# Patient Record
Sex: FEMALE | Race: WHITE | Employment: OTHER | URBAN - METROPOLITAN AREA
[De-identification: names, ages, dates, MRNs, and addresses within clinical notes are randomized per-mention and may not be internally consistent; named-entity substitution may affect disease eponyms.]

---

## 2019-04-19 ENCOUNTER — HOSPITAL ENCOUNTER (OUTPATIENT)
Dept: NEUROLOGY | Age: 84
Discharge: HOME | End: 2019-04-19
Payer: MEDICARE

## 2019-04-19 VITALS — BODY MASS INDEX: 30.4 KG/M2 | WEIGHT: 161 LBS | HEIGHT: 61 IN

## 2019-04-19 PROCEDURE — 95913 NRV CNDJ TEST 13/> STUDIES: CPT

## 2019-04-19 PROCEDURE — 95886 MUSC TEST DONE W/N TEST COMP: CPT | Performed by: PSYCHIATRY & NEUROLOGY

## 2019-04-19 PROCEDURE — 95886 MUSC TEST DONE W/N TEST COMP: CPT

## 2019-04-19 PROCEDURE — 95912 NRV CNDJ TEST 11-12 STUDIES: CPT | Performed by: PSYCHIATRY & NEUROLOGY

## 2019-04-19 NOTE — PROCEDURES
[address]  [facility].  Electrodiagnostic Laboratory  [place]        Full Name: Ethel Alaniz Gender: Female  MRN: [id number] YOB: 1934  Location[de-identified] [facility] (01)      Visit Date: 4/19/2019 08:07  Age: 80 Years 0 Months Old  Examining Physician: Dr. Kirsten Castañeda   Referring Physician: Dr. Beny Camejo  Technician: Mirna Mathis   Height: 5 feet 1 inch  Weight: 161 lbs  Notes: Right hand numbness      Motor NCS      Nerve / Sites Lat. Amplitude Amp. 1-2 Distance Lat Diff Velocity Temp.    ms mV % cm ms m/s °C   R Median - APB      Wrist 2.60 3.6 100 8   32      Elbow 8.28 3.4 92.2 23 5.68 41 32   L Median - APB      Wrist 3.96 3.9 100 8   32      Elbow 8.23 3.6 91.1 23 4.27 54 32   R Ulnar - ADM      Wrist 2.45 8.2 100 8   32      B. Elbow 5.78 7.6 93.1 20 3.33 60 32      A. Elbow 7.50 6.7 81.8 10 1.72 58 32   L Ulnar - ADM      Wrist 2.24 7.4 100 8   32      B. Elbow 5.52 6.5 87.9 19 3.28 58 32      A. Elbow 7.50 6.3 85.3 10 1.98 51 32       Sensory NCS      Nerve / Sites Onset Lat Peak Lat PP Amp Distance Velocity Temp.    ms ms µV cm m/s °C   R Median - Digit II (Antidromic)      Mid Palm 1.09 1.98 12.6 7 64 32.3      Wrist 3.28 4.74 11.1 14 43 32.3   L Median - Digit II (Antidromic)      Mid Palm 1.20 1.77 32.5 7 58 32.9      Wrist 2.60 3.49 34.6 14 54 32.9   R Ulnar - Digit V (Antidromic)      Wrist 2.55 3.28 20.2 14 55 32.2   L Ulnar - Digit V (Antidromic)      Wrist 2.03 2.92 27.9 14 69 32   R Radial - Anatomical snuff box (Forearm)      Forearm 1.20 1.72 11.4 10 83 32.3   L Radial - Anatomical snuff box (Forearm)      Forearm 1.30 1.72 5.8 10 77 31.7       Combined Sensory Index      Nerve / Sites Rec. Site Peak Lat NP Amp PP Amp Segments Peak Diff Temp.      ms µV µV  ms °C   L Median - CSI      Median Thumb 2.86 20.0 32.8 Median - Radial 0.42 32.1      Radial Thumb 2.45 10.4 13.4 Median - Ulnar 0.73 32.1      Median Ring 3.75 11.1 18.3 Median palm - Ulnar palm 0.36 32.2      Ulnar Ring radiculopathies can not be evaluated by electrodiagnostic means. Clinically, the patient presented with nocturnal paresthesias in both hands. On brief neurological examination, I found Tinel's signs at both wrists but no motor or sensory compromise. Her difficulties are likely the result of the carpal tunnel syndrome. I recommended nonsteroidals at night and wrist splints for now. Clinical correlation was highly advised.

## 2020-09-14 VITALS
TEMPERATURE: 96.8 F | SYSTOLIC BLOOD PRESSURE: 112 MMHG | DIASTOLIC BLOOD PRESSURE: 72 MMHG | WEIGHT: 161 LBS | BODY MASS INDEX: 30.4 KG/M2 | RESPIRATION RATE: 14 BRPM | HEIGHT: 61 IN | HEART RATE: 78 BPM

## 2020-11-16 RX ORDER — MELATONIN
1000 DAILY
Qty: 90 TABLET | Refills: 1 | Status: SHIPPED
Start: 2020-11-16 | End: 2021-06-17 | Stop reason: SDUPTHER

## 2020-11-18 NOTE — TELEPHONE ENCOUNTER
Patient called back states she wanted script sent to [facility] in [address] [facility]. I called [facility] spoke to the pharmacy they stated they would transfer her script over.

## 2020-12-14 NOTE — TELEPHONE ENCOUNTER
Pt called refill line and is not working, please refill levothyroxine 0.025 1 1/2 per day at [facility] in [place]

## 2020-12-16 RX ORDER — LEVOTHYROXINE SODIUM 0.03 MG/1
TABLET ORAL
Qty: 135 TABLET | Refills: 1 | Status: SHIPPED | OUTPATIENT
Start: 2020-12-16 | End: 2021-05-27 | Stop reason: SDUPTHER

## 2021-01-13 ENCOUNTER — TELEPHONE (OUTPATIENT)
Dept: FAMILY MEDICINE CLINIC | Age: 86
End: 2021-01-13

## 2021-01-13 NOTE — TELEPHONE ENCOUNTER
Spoke to patient information provided she had a localized swelling in the arm with the flu shot many years ago she does not have any egg allergy information provided

## 2021-01-20 ENCOUNTER — IMMUNIZATION (OUTPATIENT)
Dept: PRIMARY CARE CLINIC | Age: 86
End: 2021-01-20
Payer: MEDICARE

## 2021-01-20 DIAGNOSIS — Z23 HIGH PRIORITY FOR COVID-19 VIRUS VACCINATION: Primary | ICD-10-CM

## 2021-01-20 PROCEDURE — 91301 COVID-19, MODERNA VACCINE 100MCG/0.5ML DOSE: CPT | Performed by: NURSE PRACTITIONER

## 2021-01-20 PROCEDURE — 0011A COVID-19, MODERNA VACCINE 100MCG/0.5ML DOSE: CPT | Performed by: NURSE PRACTITIONER

## 2021-01-29 RX ORDER — OMEPRAZOLE 20 MG/1
40 TABLET, DELAYED RELEASE ORAL DAILY
Qty: 90 TABLET | Refills: 1 | Status: SHIPPED
Start: 2021-01-29 | End: 2021-02-04

## 2021-01-29 NOTE — TELEPHONE ENCOUNTER
Patient is requesting a refill of:  Medication:Omeprazole   Dose: 40mg  Frequency: qd  Pharmacy: [facility] seen Visit date not found  Next appt Visit date not found

## 2021-02-04 ENCOUNTER — OFFICE VISIT (OUTPATIENT)
Dept: FAMILY MEDICINE CLINIC | Age: 86
End: 2021-02-04
Payer: MEDICARE

## 2021-02-04 VITALS
BODY MASS INDEX: 29.95 KG/M2 | SYSTOLIC BLOOD PRESSURE: 130 MMHG | OXYGEN SATURATION: 99 % | HEIGHT: 63 IN | WEIGHT: 169 LBS | DIASTOLIC BLOOD PRESSURE: 70 MMHG | HEART RATE: 73 BPM

## 2021-02-04 DIAGNOSIS — J45.909 MILD ASTHMA WITHOUT COMPLICATION, UNSPECIFIED WHETHER PERSISTENT: Primary | ICD-10-CM

## 2021-02-04 DIAGNOSIS — K21.9 GASTROESOPHAGEAL REFLUX DISEASE WITHOUT ESOPHAGITIS: ICD-10-CM

## 2021-02-04 DIAGNOSIS — E03.9 HYPOTHYROIDISM, UNSPECIFIED TYPE: ICD-10-CM

## 2021-02-04 DIAGNOSIS — Z85.3 HISTORY OF BREAST CANCER: ICD-10-CM

## 2021-02-04 DIAGNOSIS — R05.9 COUGH: ICD-10-CM

## 2021-02-04 PROCEDURE — G8427 DOCREV CUR MEDS BY ELIG CLIN: HCPCS | Performed by: INTERNAL MEDICINE

## 2021-02-04 PROCEDURE — 1090F PRES/ABSN URINE INCON ASSESS: CPT | Performed by: INTERNAL MEDICINE

## 2021-02-04 PROCEDURE — 1123F ACP DISCUSS/DSCN MKR DOCD: CPT | Performed by: INTERNAL MEDICINE

## 2021-02-04 PROCEDURE — 4004F PT TOBACCO SCREEN RCVD TLK: CPT | Performed by: INTERNAL MEDICINE

## 2021-02-04 PROCEDURE — 4040F PNEUMOC VAC/ADMIN/RCVD: CPT | Performed by: INTERNAL MEDICINE

## 2021-02-04 PROCEDURE — 3288F FALL RISK ASSESSMENT DOCD: CPT | Performed by: INTERNAL MEDICINE

## 2021-02-04 PROCEDURE — G8417 CALC BMI ABV UP PARAM F/U: HCPCS | Performed by: INTERNAL MEDICINE

## 2021-02-04 PROCEDURE — 99213 OFFICE O/P EST LOW 20 MIN: CPT | Performed by: INTERNAL MEDICINE

## 2021-02-04 PROCEDURE — G8484 FLU IMMUNIZE NO ADMIN: HCPCS | Performed by: INTERNAL MEDICINE

## 2021-02-04 RX ORDER — OMEPRAZOLE 40 MG/1
CAPSULE, DELAYED RELEASE ORAL
COMMUNITY
Start: 2020-10-28 | End: 2021-02-04 | Stop reason: SDUPTHER

## 2021-02-04 RX ORDER — OMEPRAZOLE 40 MG/1
40 CAPSULE, DELAYED RELEASE ORAL DAILY
Qty: 90 CAPSULE | Refills: 1 | Status: SHIPPED
Start: 2021-02-04 | End: 2021-08-09 | Stop reason: SDUPTHER

## 2021-02-04 SDOH — ECONOMIC STABILITY: INCOME INSECURITY: HOW HARD IS IT FOR YOU TO PAY FOR THE VERY BASICS LIKE FOOD, HOUSING, MEDICAL CARE, AND HEATING?: NOT ASKED

## 2021-02-04 SDOH — ECONOMIC STABILITY: TRANSPORTATION INSECURITY
IN THE PAST 12 MONTHS, HAS LACK OF TRANSPORTATION KEPT YOU FROM MEETINGS, WORK, OR FROM GETTING THINGS NEEDED FOR DAILY LIVING?: NO

## 2021-02-04 ASSESSMENT — PATIENT HEALTH QUESTIONNAIRE - PHQ9
SUM OF ALL RESPONSES TO PHQ QUESTIONS 1-9: 0
SUM OF ALL RESPONSES TO PHQ9 QUESTIONS 1 & 2: 0
SUM OF ALL RESPONSES TO PHQ QUESTIONS 1-9: 0

## 2021-02-04 NOTE — PROGRESS NOTES
Subjective:     Chief Complaint   Patient presents with    Asthma    Joint Pain      Follow-up on the blood work check thyroid a GE reflux disease  Has slight prominence of the left sternoclavicular joint    Denies feeling any lumps  Has occasional cough    Has appointment to see Dr. Richard Paige    Has been using the nebulizer    Sees Dr. Nakia Palmer for breast cancer once a year    Has generalized osteoarthritis  Past Medical History:   Diagnosis Date    Arthritis     OSTEOPORASIS    Asthma     STABLE    CAD (coronary artery disease)     30% stenosis per cath 2007    Cancer [facility]) 2004    BREAST RIGHT    Cataracts, both eyes     Eczema     Hypothyroidism     Macular degeneration     Osteoarthritis     Rosacea     Thyroid disease         Social History     Socioeconomic History    Marital status:      Spouse name: Not on file    Number of children: Not on file    Years of education: Not on file    Highest education level: Not on file   Occupational History    Not on file   Social Needs    Financial resource strain: Not on file    Food insecurity     Worry: Never true     Inability: Never true    Transportation needs     Medical: No     Non-medical: No   Tobacco Use    Smoking status: Never Smoker    Smokeless tobacco: Never Used   Substance and Sexual Activity    Alcohol use: No    Drug use: No    Sexual activity: Not on file   Lifestyle    Physical activity     Days per week: Not on file     Minutes per session: Not on file    Stress: Not on file   Relationships    Social connections     Talks on phone: Not on file     Gets together: Not on file     Attends Bahai service: Not on file     Active member of club or organization: Not on file     Attends meetings of clubs or organizations: Not on file     Relationship status: Not on file    Intimate partner violence     Fear of current or ex partner: Not on file     Emotionally abused: Not on file     Physically abused: Not on file Forced sexual activity: Not on file   Other Topics Concern    Not on file   Social History Narrative    Not on file        Past Surgical History:   Procedure Laterality Date    [facility]    BREAST LUMPECTOMY Right     BREAST SURGERY      LUMPECTOMY RIGHT    CARDIAC CATHETERIZATION  03/2007    COLONOSCOPY      HYSTERECTOMY      JOINT REPLACEMENT  Jan. 30,2012.    bilateral knee replacements        No family history on file. Allergies   Allergen Reactions    Penicillins Rash    Chlorpheniramine Maleate      SPLENDA, FLU SHOT    Flu Virus Vaccine Swelling    Protonix [Pantoprazole Sodium]         ROS  No acute distress  Cardiac: Denies any chest pain or palpitation  Respiratory: Denies any cough or shortness of breath  History of asthma using nebulizer as needed has appointment to see the pulmonologist  GI: No abdominal pain. Denies any nausea vomiting or diarrhea  : Denies any dysuria frequency or hematuria  Neuro: No headache or dizziness  Endocrine: Hyperglycemia  Skin: normal  No recent weight gain or weight loss  Denies any change in vision    Objective:    /70   Pulse 73   Ht 5' 3\" (1.6 m)   Wt 169 lb (76.7 kg)   SpO2 99%   BMI 29.94 kg/m²     Constitutional: Alert awake and oriented  Eyes: Pupils equal bilaterally. Extraocular muscles intact  Neck: no JVD adenopathy no bruit  Chest slight prominence of the left sternoclavicular joint                                  No  lymphadenopathy  Heart:  RRR, no murmurs, gallops, or rubs.   Lungs:    no wheeze, rales or rhonchi  Abd: bowel sounds present, nontender, nondistended, no masses  Extrem:  No clubbing, cyanosis, or edema  Neuro: AAOx3,No Focal deficit  Psychological: no depression or anxiety       Current Outpatient Medications   Medication Sig Dispense Refill    omeprazole (PRILOSEC) 40 MG delayed release capsule Take 1 capsule by mouth daily 90 capsule 1  levothyroxine (SYNTHROID) 25 MCG tablet One and one half tablets daily 135 tablet 1    vitamin D3 (CHOLECALCIFEROL) 25 MCG (1000 UT) TABS tablet Take 1 tablet by mouth daily 90 tablet 1    famotidine (PEPCID) 20 MG tablet Take 20 mg by mouth nightly.  albuterol (PROVENTIL) (2.5 MG/3ML) 0.083% nebulizer solution Take 2.5 mg by nebulization every 6 hours as needed.  fluticasone (FLONASE) 50 MCG/ACT nasal spray 1 spray by Nasal route nightly.  calcium carbonate 600 MG TABS tablet Take 1 tablet by mouth 4 times daily. No current facility-administered medications for this visit.          Last 3 BMP  Lab Results   Component Value Date/Time     01/18/2021 08:31 AM     04/01/2014 01:20 PM     08/15/2012 11:05 AM    K 4.8 01/18/2021 08:31 AM    K 3.8 04/01/2014 01:20 PM    K 4.0 08/15/2012 11:05 AM     01/18/2021 08:31 AM     04/01/2014 01:20 PM     08/15/2012 11:05 AM    CO2 23 01/18/2021 08:31 AM    CO2 25 04/01/2014 01:20 PM    CO2 31 08/15/2012 11:05 AM    BUN 21 01/18/2021 08:31 AM    BUN 13 04/01/2014 01:20 PM    BUN 18 08/15/2012 11:05 AM    CREATININE 1.0 01/18/2021 08:31 AM    CREATININE 1.1 (H) 04/01/2014 01:20 PM    CREATININE 0.8 08/15/2012 11:05 AM    GLUCOSE 103 (H) 01/18/2021 08:31 AM    GLUCOSE 121 (H) 04/01/2014 01:20 PM    GLUCOSE 85 08/15/2012 11:05 AM    GLUCOSE 90 02/09/2012 05:20 AM    GLUCOSE 108 02/06/2012 05:10 AM    GLUCOSE 91 02/03/2012 05:08 AM    CALCIUM 10.0 01/18/2021 08:31 AM    CALCIUM 9.0 04/01/2014 01:20 PM    CALCIUM 9.8 08/15/2012 11:05 AM       Last 3 CMP:    Lab Results   Component Value Date/Time     01/18/2021 08:31 AM     04/01/2014 01:20 PM     08/15/2012 11:05 AM    K 4.8 01/18/2021 08:31 AM    K 3.8 04/01/2014 01:20 PM    K 4.0 08/15/2012 11:05 AM     01/18/2021 08:31 AM     04/01/2014 01:20 PM     08/15/2012 11:05 AM    CO2 23 01/18/2021 08:31 AM    CO2 25 04/01/2014 01:20 PM CO2 31 08/15/2012 11:05 AM    BUN 21 01/18/2021 08:31 AM    BUN 13 04/01/2014 01:20 PM    BUN 18 08/15/2012 11:05 AM    CREATININE 1.0 01/18/2021 08:31 AM    CREATININE 1.1 (H) 04/01/2014 01:20 PM    CREATININE 0.8 08/15/2012 11:05 AM    GLUCOSE 103 (H) 01/18/2021 08:31 AM    GLUCOSE 121 (H) 04/01/2014 01:20 PM    GLUCOSE 85 08/15/2012 11:05 AM    GLUCOSE 90 02/09/2012 05:20 AM    GLUCOSE 108 02/06/2012 05:10 AM    GLUCOSE 91 02/03/2012 05:08 AM    CALCIUM 10.0 01/18/2021 08:31 AM    CALCIUM 9.0 04/01/2014 01:20 PM    CALCIUM 9.8 08/15/2012 11:05 AM    PROT 7.2 01/18/2021 08:31 AM    PROT 5.0 (L) 02/01/2012 04:20 AM    LABALBU 4.3 01/18/2021 08:31 AM    LABALBU 2.9 (L) 02/01/2012 04:20 AM    BILITOT 0.6 01/18/2021 08:31 AM    BILITOT 0.4 02/01/2012 04:20 AM    ALKPHOS 68 01/18/2021 08:31 AM    ALKPHOS 36 (L) 02/01/2012 04:20 AM    AST 16 01/18/2021 08:31 AM    AST 14 02/01/2012 04:20 AM    ALT 12 01/18/2021 08:31 AM    ALT 11 02/01/2012 04:20 AM        CBC:   Lab Results   Component Value Date/Time    WBC 4.0 (L) 01/18/2021 08:31 AM    RBC 4.66 01/18/2021 08:31 AM    HGB 14.1 01/18/2021 08:31 AM    HCT 44.2 01/18/2021 08:31 AM    MCV 94.8 01/18/2021 08:31 AM    MCH 30.3 01/18/2021 08:31 AM    MCHC 31.9 (L) 01/18/2021 08:31 AM    RDW 14.1 01/18/2021 08:31 AM     01/18/2021 08:31 AM    MPV 11.2 01/18/2021 08:31 AM       A1C:  Lab Results   Component Value Date/Time    LABA1C 5.9 (H) 01/18/2021 08:31 AM       Lipid panel:  Lab Results   Component Value Date    CHOL 190 01/18/2021    TRIG 101 01/18/2021    HDL 65 01/18/2021        Lab Results   Component Value Date/Time    PROT 7.2 01/18/2021 08:31 AM    PROT 5.0 (L) 02/01/2012 04:20 AM       No results found for: MG      Assessment. Debi Houston was seen today for asthma and joint pain. Diagnoses and all orders for this visit:    Mild asthma without complication, unspecified whether persistent  -     XR CHEST (2 VW);  Future    Cough -     XR CHEST (2 VW); Future    Hypothyroidism, unspecified type    Gastroesophageal reflux disease without esophagitis    History of breast cancer    Other orders  -     omeprazole (PRILOSEC) 40 MG delayed release capsule; Take 1 capsule by mouth daily       Patient Active Problem List   Diagnosis    DJD (degenerative joint disease) of knee    Asthma    Carotid stenosis, asymptomatic    Spinal stenosis, lumbar region, without neurogenic claudication       Plan: Labs reviewed  Asthma doing very well    GE reflux disease controlled with omeprazole 40 mg    She sees gastroenterologist    Get a chest x-ray PA and lateral and follow-up with Dr. Meryle Chard has appointment next month with PFTs      Same thyroid medication    Diet medication discussed      Continue Flonase no spray          Return in about 3 months (around 5/4/2021).        Brittnee Lin MD  2:36 PM  2/4/2021     [place]

## 2021-02-17 ENCOUNTER — IMMUNIZATION (OUTPATIENT)
Dept: PRIMARY CARE CLINIC | Age: 86
End: 2021-02-17
Payer: MEDICARE

## 2021-02-17 PROCEDURE — 91301 COVID-19, MODERNA VACCINE 100MCG/0.5ML DOSE: CPT | Performed by: NURSE PRACTITIONER

## 2021-02-17 PROCEDURE — 0012A COVID-19, MODERNA VACCINE 100MCG/0.5ML DOSE: CPT | Performed by: NURSE PRACTITIONER

## 2021-05-27 ENCOUNTER — OFFICE VISIT (OUTPATIENT)
Dept: FAMILY MEDICINE CLINIC | Age: 86
End: 2021-05-27
Payer: MEDICARE

## 2021-05-27 VITALS
DIASTOLIC BLOOD PRESSURE: 70 MMHG | SYSTOLIC BLOOD PRESSURE: 120 MMHG | WEIGHT: 154 LBS | BODY MASS INDEX: 27.28 KG/M2 | HEART RATE: 63 BPM | TEMPERATURE: 98 F | OXYGEN SATURATION: 98 %

## 2021-05-27 DIAGNOSIS — E78.49 OTHER HYPERLIPIDEMIA: Primary | ICD-10-CM

## 2021-05-27 DIAGNOSIS — R73.9 HYPERGLYCEMIA: ICD-10-CM

## 2021-05-27 DIAGNOSIS — E03.9 ACQUIRED HYPOTHYROIDISM: ICD-10-CM

## 2021-05-27 DIAGNOSIS — D70.9 NEUTROPENIA, UNSPECIFIED TYPE (HCC): ICD-10-CM

## 2021-05-27 DIAGNOSIS — J45.20 MILD INTERMITTENT ASTHMA WITHOUT COMPLICATION: ICD-10-CM

## 2021-05-27 DIAGNOSIS — F41.1 GENERALIZED ANXIETY DISORDER: ICD-10-CM

## 2021-05-27 PROCEDURE — 4040F PNEUMOC VAC/ADMIN/RCVD: CPT | Performed by: INTERNAL MEDICINE

## 2021-05-27 PROCEDURE — 4004F PT TOBACCO SCREEN RCVD TLK: CPT | Performed by: INTERNAL MEDICINE

## 2021-05-27 PROCEDURE — G8427 DOCREV CUR MEDS BY ELIG CLIN: HCPCS | Performed by: INTERNAL MEDICINE

## 2021-05-27 PROCEDURE — 1123F ACP DISCUSS/DSCN MKR DOCD: CPT | Performed by: INTERNAL MEDICINE

## 2021-05-27 PROCEDURE — 1090F PRES/ABSN URINE INCON ASSESS: CPT | Performed by: INTERNAL MEDICINE

## 2021-05-27 PROCEDURE — G8417 CALC BMI ABV UP PARAM F/U: HCPCS | Performed by: INTERNAL MEDICINE

## 2021-05-27 PROCEDURE — 99213 OFFICE O/P EST LOW 20 MIN: CPT | Performed by: INTERNAL MEDICINE

## 2021-05-27 RX ORDER — ALPRAZOLAM 0.5 MG/1
0.5 TABLET ORAL 3 TIMES DAILY PRN
Qty: 20 TABLET | Refills: 0 | Status: SHIPPED | OUTPATIENT
Start: 2021-05-27 | End: 2021-06-26

## 2021-05-27 RX ORDER — LEVOTHYROXINE SODIUM 0.03 MG/1
TABLET ORAL
Qty: 135 TABLET | Refills: 1 | Status: SHIPPED
Start: 2021-05-27 | End: 2021-09-30 | Stop reason: SDUPTHER

## 2021-05-27 NOTE — PROGRESS NOTES
Friends and Family:     Attends Worship Services:     Active Member of Clubs or Organizations:     Attends Club or Organization Meetings:     Marital Status:    Intimate Partner Violence:     Fear of Current or Ex-Partner:     Emotionally Abused:     Physically Abused:     Sexually Abused:         Past Surgical History:   Procedure Laterality Date    APPENDECTOMY      BACK SURGERY      LUMBAR FUSION    BREAST LUMPECTOMY Right     BREAST SURGERY      LUMPECTOMY RIGHT    CARDIAC CATHETERIZATION  03/2007    COLONOSCOPY      HYSTERECTOMY      JOINT REPLACEMENT  Jan. 30,2012.    bilateral knee replacements        No family history on file. Allergies   Allergen Reactions    Penicillins Rash    Chlorpheniramine Maleate      SPLENDA, FLU SHOT    Flu Virus Vaccine Swelling    Protonix [Pantoprazole Sodium]         ROS  No acute distress  Cardiac: Denies any chest pain or palpitation    Seen by Dr. Fanny Solares in 2018 he did not recommend any further work-up unless any symptoms  Respiratory: Denies any cough or shortness of breath  Seen by pulmonologist Dr. Cathy Weathers in March 2020  GI: No abdominal pain. Denies any nausea vomiting or diarrhea    Colonoscopy August 2018 with Dr. Cresencio Moya to repeat as needed  : Denies any dysuria frequency or hematuria  Neuro: No headache or dizziness  Endocrine: No diabetes  Skin: normal  No recent weight gain or weight loss  Denies any change in vision    Objective:    /70   Pulse 63   Temp 98 °F (36.7 °C)   Wt 154 lb (69.9 kg)   SpO2 98%   BMI 27.28 kg/m²     Constitutional: Alert awake and oriented  Eyes: Pupils equal bilaterally. Extraocular muscles intact  Neck: no JVD adenopathy no bruit  Heart:  RRR, no murmurs, gallops, or rubs.   Lungs:    no wheeze, rales or rhonchi  Abd: bowel sounds present, nontender, nondistended, no masses  Extrem:  No clubbing, cyanosis, or edema  Neuro: AAOx3,No Focal deficit  Psychological: no depression   Anxious about 's terminal illness      Current Outpatient Medications   Medication Sig Dispense Refill    levothyroxine (SYNTHROID) 25 MCG tablet One and one half tablets daily 135 tablet 1    ALPRAZolam (XANAX) 0.5 MG tablet Take 1 tablet by mouth 3 times daily as needed for Sleep or Anxiety for up to 30 days. 20 tablet 0    omeprazole (PRILOSEC) 40 MG delayed release capsule Take 1 capsule by mouth daily 90 capsule 1    vitamin D3 (CHOLECALCIFEROL) 25 MCG (1000 UT) TABS tablet Take 1 tablet by mouth daily 90 tablet 1    famotidine (PEPCID) 20 MG tablet Take 20 mg by mouth nightly.  albuterol (PROVENTIL) (2.5 MG/3ML) 0.083% nebulizer solution Take 2.5 mg by nebulization every 6 hours as needed.  fluticasone (FLONASE) 50 MCG/ACT nasal spray 1 spray by Nasal route nightly.  calcium carbonate 600 MG TABS tablet Take 1 tablet by mouth 4 times daily. No current facility-administered medications for this visit.         Last 3 BMP  Lab Results   Component Value Date/Time     01/18/2021 08:31 AM     04/01/2014 01:20 PM     08/15/2012 11:05 AM    K 4.8 01/18/2021 08:31 AM    K 3.8 04/01/2014 01:20 PM    K 4.0 08/15/2012 11:05 AM     01/18/2021 08:31 AM     04/01/2014 01:20 PM     08/15/2012 11:05 AM    CO2 23 01/18/2021 08:31 AM    CO2 25 04/01/2014 01:20 PM    CO2 31 08/15/2012 11:05 AM    BUN 21 01/18/2021 08:31 AM    BUN 13 04/01/2014 01:20 PM    BUN 18 08/15/2012 11:05 AM    CREATININE 1.0 01/18/2021 08:31 AM    CREATININE 1.1 (H) 04/01/2014 01:20 PM    CREATININE 0.8 08/15/2012 11:05 AM    GLUCOSE 103 (H) 01/18/2021 08:31 AM    GLUCOSE 121 (H) 04/01/2014 01:20 PM    GLUCOSE 85 08/15/2012 11:05 AM    GLUCOSE 90 02/09/2012 05:20 AM    GLUCOSE 108 02/06/2012 05:10 AM    GLUCOSE 91 02/03/2012 05:08 AM    CALCIUM 10.0 01/18/2021 08:31 AM    CALCIUM 9.0 04/01/2014 01:20 PM    CALCIUM 9.8 08/15/2012 11:05 AM       Last 3 CMP:    Lab Results   Component Value Date/Time    NA 144 01/18/2021 08:31 AM     04/01/2014 01:20 PM     08/15/2012 11:05 AM    K 4.8 01/18/2021 08:31 AM    K 3.8 04/01/2014 01:20 PM    K 4.0 08/15/2012 11:05 AM     01/18/2021 08:31 AM     04/01/2014 01:20 PM     08/15/2012 11:05 AM    CO2 23 01/18/2021 08:31 AM    CO2 25 04/01/2014 01:20 PM    CO2 31 08/15/2012 11:05 AM    BUN 21 01/18/2021 08:31 AM    BUN 13 04/01/2014 01:20 PM    BUN 18 08/15/2012 11:05 AM    CREATININE 1.0 01/18/2021 08:31 AM    CREATININE 1.1 (H) 04/01/2014 01:20 PM    CREATININE 0.8 08/15/2012 11:05 AM    GLUCOSE 103 (H) 01/18/2021 08:31 AM    GLUCOSE 121 (H) 04/01/2014 01:20 PM    GLUCOSE 85 08/15/2012 11:05 AM    GLUCOSE 90 02/09/2012 05:20 AM    GLUCOSE 108 02/06/2012 05:10 AM    GLUCOSE 91 02/03/2012 05:08 AM    CALCIUM 10.0 01/18/2021 08:31 AM    CALCIUM 9.0 04/01/2014 01:20 PM    CALCIUM 9.8 08/15/2012 11:05 AM    PROT 7.2 01/18/2021 08:31 AM    PROT 5.0 (L) 02/01/2012 04:20 AM    LABALBU 4.3 01/18/2021 08:31 AM    LABALBU 2.9 (L) 02/01/2012 04:20 AM    BILITOT 0.6 01/18/2021 08:31 AM    BILITOT 0.4 02/01/2012 04:20 AM    ALKPHOS 68 01/18/2021 08:31 AM    ALKPHOS 36 (L) 02/01/2012 04:20 AM    AST 16 01/18/2021 08:31 AM    AST 14 02/01/2012 04:20 AM    ALT 12 01/18/2021 08:31 AM    ALT 11 02/01/2012 04:20 AM        CBC:   Lab Results   Component Value Date/Time    WBC 4.0 (L) 01/18/2021 08:31 AM    RBC 4.66 01/18/2021 08:31 AM    HGB 14.1 01/18/2021 08:31 AM    HCT 44.2 01/18/2021 08:31 AM    MCV 94.8 01/18/2021 08:31 AM    MCH 30.3 01/18/2021 08:31 AM    MCHC 31.9 (L) 01/18/2021 08:31 AM    RDW 14.1 01/18/2021 08:31 AM     01/18/2021 08:31 AM    MPV 11.2 01/18/2021 08:31 AM       A1C:  Lab Results   Component Value Date/Time    LABA1C 5.9 (H) 01/18/2021 08:31 AM       Lipid panel:  Lab Results   Component Value Date    CHOL 190 01/18/2021    TRIG 101 01/18/2021    HDL 65 01/18/2021        Lab Results   Component Value Date/Time    PROT 7.2 01/18/2021 08:31 AM    PROT 5.0 (L) 02/01/2012 04:20 AM       No results found for: MG      Assessment. Maurine Scheuermann was seen today for anxiety. Diagnoses and all orders for this visit:    Other hyperlipidemia  -     COMPREHENSIVE METABOLIC PANEL; Future  -     LIPID PANEL; Future    Hyperglycemia  -     HEMOGLOBIN A1C; Future    Acquired hypothyroidism  -     TSH; Future  -     T4, FREE; Future    Neutropenia, unspecified type ([facility] [address]  -     CBC WITH AUTO DIFFERENTIAL; Future    Generalized anxiety disorder  -     ALPRAZolam (XANAX) 0.5 MG tablet; Take 1 tablet by mouth 3 times daily as needed for Sleep or Anxiety for up to 30 days. Other orders  -     levothyroxine (SYNTHROID) 25 MCG tablet; One and one half tablets daily       Patient Active Problem List   Diagnosis    DJD (degenerative joint disease) of knee    Asthma    Carotid stenosis, asymptomatic    Spinal stenosis, lumbar region, without neurogenic claudication       Plan: Hyperlipidemia low-cholesterol low-fat diet check CMP and lipid profile    Hypothyroidism continue Synthroid 25 check TSH and T4    Generalized anxiety due to  is on hospice patient was counseled, she has family support, Xanax 0.5 daily as needed side effect discussed    Asthma is controlled being followed by pulmonologist    Right total slightly low last time repeat CBC    Return in about 4 months (around 9/27/2021).        Nikolay Granda MD  1:59 PM  5/27/2021     [place]

## 2021-06-02 ENCOUNTER — OFFICE VISIT (OUTPATIENT)
Dept: FAMILY MEDICINE CLINIC | Age: 86
End: 2021-06-02
Payer: MEDICARE

## 2021-06-02 VITALS
BODY MASS INDEX: 28.89 KG/M2 | RESPIRATION RATE: 17 BRPM | TEMPERATURE: 97.3 F | DIASTOLIC BLOOD PRESSURE: 78 MMHG | WEIGHT: 153 LBS | OXYGEN SATURATION: 99 % | HEART RATE: 69 BPM | SYSTOLIC BLOOD PRESSURE: 122 MMHG | HEIGHT: 61 IN

## 2021-06-02 DIAGNOSIS — R30.0 DYSURIA: ICD-10-CM

## 2021-06-02 DIAGNOSIS — N30.90 CYSTITIS: Primary | ICD-10-CM

## 2021-06-02 DIAGNOSIS — E03.9 ACQUIRED HYPOTHYROIDISM: ICD-10-CM

## 2021-06-02 LAB
BILIRUBIN, POC: NORMAL
BLOOD URINE, POC: NORMAL
CLARITY, POC: CLEAR
COLOR, POC: YELLOW
GLUCOSE URINE, POC: NORMAL
KETONES, POC: NORMAL
LEUKOCYTE EST, POC: NORMAL
NITRITE, POC: NORMAL
PH, POC: 7
PROTEIN, POC: NORMAL
SPECIFIC GRAVITY, POC: 1.02
UROBILINOGEN, POC: 0.2

## 2021-06-02 PROCEDURE — G8417 CALC BMI ABV UP PARAM F/U: HCPCS | Performed by: PHYSICIAN ASSISTANT

## 2021-06-02 PROCEDURE — 1090F PRES/ABSN URINE INCON ASSESS: CPT | Performed by: PHYSICIAN ASSISTANT

## 2021-06-02 PROCEDURE — 99213 OFFICE O/P EST LOW 20 MIN: CPT | Performed by: PHYSICIAN ASSISTANT

## 2021-06-02 PROCEDURE — 1123F ACP DISCUSS/DSCN MKR DOCD: CPT | Performed by: PHYSICIAN ASSISTANT

## 2021-06-02 PROCEDURE — 81002 URINALYSIS NONAUTO W/O SCOPE: CPT | Performed by: PHYSICIAN ASSISTANT

## 2021-06-02 PROCEDURE — G8427 DOCREV CUR MEDS BY ELIG CLIN: HCPCS | Performed by: PHYSICIAN ASSISTANT

## 2021-06-02 PROCEDURE — 1036F TOBACCO NON-USER: CPT | Performed by: PHYSICIAN ASSISTANT

## 2021-06-02 PROCEDURE — 4040F PNEUMOC VAC/ADMIN/RCVD: CPT | Performed by: PHYSICIAN ASSISTANT

## 2021-06-02 RX ORDER — SULFAMETHOXAZOLE AND TRIMETHOPRIM 800; 160 MG/1; MG/1
1 TABLET ORAL 2 TIMES DAILY
Qty: 10 TABLET | Refills: 0 | Status: SHIPPED | OUTPATIENT
Start: 2021-06-02 | End: 2021-06-07

## 2021-06-02 NOTE — PATIENT INSTRUCTIONS
Patient Education        Urinary Tract Infection (UTI) in Women: Care Instructions  Overview     A urinary tract infection, or UTI, is a general term for an infection anywhere between the kidneys and the urethra (where urine comes out). Most UTIs are bladder infections. They often cause pain or burning when you urinate. UTIs are caused by bacteria and can be cured with antibiotics. Be sure to complete your treatment so that the infection does not get worse. Follow-up care is a key part of your treatment and safety. Be sure to make and go to all appointments, and call your doctor if you are having problems. It's also a good idea to know your test results and keep a list of the medicines you take. How can you care for yourself at home? · Take your antibiotics as directed. Do not stop taking them just because you feel better. You need to take the full course of antibiotics. · Drink extra water and other fluids for the next day or two. This will help make the urine less concentrated and help wash out the bacteria that are causing the infection. (If you have kidney, heart, or liver disease and have to limit fluids, talk with your doctor before you increase the amount of fluids you drink.)  · Avoid drinks that are carbonated or have caffeine. They can irritate the bladder. · Urinate often. Try to empty your bladder each time. · To relieve pain, take a hot bath or lay a heating pad set on low over your lower belly or genital area. Never go to sleep with a heating pad in place. To prevent UTIs  · Drink plenty of water each day. This helps you urinate often, which clears bacteria from your system. (If you have kidney, heart, or liver disease and have to limit fluids, talk with your doctor before you increase the amount of fluids you drink.)  · Urinate when you need to. · If you are sexually active, urinate right after you have sex. · Change sanitary pads often.   · Avoid douches, bubble baths, feminine hygiene sprays, and other feminine hygiene products that have deodorants. · After going to the bathroom, wipe from front to back. When should you call for help? Call your doctor now or seek immediate medical care if:    · Symptoms such as fever, chills, nausea, or vomiting get worse or appear for the first time.     · You have new pain in your back just below your rib cage. This is called flank pain.     · There is new blood or pus in your urine.     · You have any problems with your antibiotic medicine. Watch closely for changes in your health, and be sure to contact your doctor if:    · You are not getting better after taking an antibiotic for 2 days.     · Your symptoms go away but then come back. Where can you learn more? Go to [email or web address]://[email or web address]. org and sign in to your [employer] account. Enter N277 in the [employer] box to learn more about \"Urinary Tract Infection (UTI) in Women: Care Instructions. \"     If you do not have an account, please click on the \"Sign Up Now\" link. Current as of: June 29, 2020               Content Version: 12.8  © 2006-2021 [employer]. Care instructions adapted under license by [facility] [address] If you have questions about a medical condition or this instruction, always ask your healthcare professional. [place] [address] any warranty or liability for your use of this information. Patient Education        Painful Urination (Dysuria): Care Instructions  Your Care Instructions  Burning pain with urination (dysuria) is a common symptom of a urinary tract infection or other urinary problems. The bladder may become inflamed. This can cause pain when the bladder fills and empties. You may also feel pain if the tube that carries urine from the bladder to the outside of the body (urethra) gets irritated or infected. Sexually transmitted infections (STIs) also may cause pain when you urinate.   Sometimes the pain can be caused Instructions. \"     If you do not have an account, please click on the \"Sign Up Now\" link. Current as of: June 29, 2020               Content Version: 12.8  © 2006-2021 [employer]. Care instructions adapted under license by [facility] ([facility]). If you have questions about a medical condition or this instruction, always ask your healthcare professional. [address] any warranty or liability for your use of this information.

## 2021-06-02 NOTE — PROGRESS NOTES
Chief Complaint:   Urinary Tract Infection (Burning , pressure and urgency  started Saturday )      History of Present Illness   Source of history provided by:  patient . Aayush Cummings is a 80 y.o. old female who has a past medical history of:   Past Medical History:   Diagnosis Date    Arthritis     OSTEOPORASIS    Asthma     STABLE    CAD (coronary artery disease)     30% stenosis per cath 2007    Cancer [facility]) 2004    BREAST RIGHT    Cataracts, both eyes     Eczema     Hypothyroidism     Macular degeneration     Osteoarthritis     Rosacea     Thyroid disease     Presents to the walk in clinic with complaints of dysuria and urinary urgency onset 4 days ago. The patient reports that her symptoms have been persistent. She states that her  just passed away 2 days ago. He was very ill from stage 4 cancer, just diagnosed in February and went very quickly. She has been trying to drink plenty of water, but he was recently in the hospital and then home with hospice and has been very concerned about him. She reports that her daughter wanted her to get checked as she has had persistent burning with urination and she has been taking some cranberry pills as well as some azo on and off as well as some mild chills last night. The  is on Friday and she wanted to make sure she did not get worse as they have a lot of family coming in from out of town. She has had no fever or chills. She is unaware of noting any visible blood in her urine. She complains of no severe back pain. She has had a previous urine infection, but it has been many years ago. She has had no gross hematuria and reports no vaginal discharge, vaginal bleeding,  vomiting, diarrhea, or extreme lethargy. No LMP recorded. Patient has had a hysterectomy.       ROS    Unless otherwise stated in this report or unable to obtain because of the patient's clinical or mental status as evidenced by the medical record, this patients's positive and negative responses for Review of Systems, constitutional, psych, eyes, ENT, cardiovascular, respiratory, gastrointestinal, neurological, genitourinary, musculoskeletal, integument systems and systems related to the presenting problem are either stated in the preceding or were not pertinent or were negative for the symptoms and/or complaints related to the medical problem. Past Surgical history:   Past Surgical History:   Procedure Laterality Date    APPENDECTOMY      BACK SURGERY      LUMBAR FUSION    BREAST LUMPECTOMY Right     BREAST SURGERY      LUMPECTOMY RIGHT    CARDIAC CATHETERIZATION  03/2007    COLONOSCOPY      HYSTERECTOMY      JOINT REPLACEMENT  Jan. 30,2012.    bilateral knee replacements     Social History:  reports that she has never smoked. She has never used smokeless tobacco. She reports that she does not drink alcohol and does not use drugs. Family History: family history is not on file. Allergies: Penicillins, Chlorpheniramine maleate, Flu virus vaccine, and Protonix [pantoprazole sodium]    Physical Exam         VS:  /78 (Site: Left Upper Arm, Position: Sitting, Cuff Size: Large Adult)   Pulse 69   Temp 97.3 °F (36.3 °C)   Resp 17   Ht 5' 1\" (1.549 m)   Wt 153 lb (69.4 kg)   SpO2 99%   BMI 28.91 kg/m²    Oxygen Saturation Interpretation: Normal.    Constitutional:  A&Ox3, development consistent with age, NAD. Non toxic in appearance. Lungs:  Clear to ausculation without wheezing, rales, or rhonchi. Heart:  Appears regular with no obvious ectopy. Abdomen: Soft, nondistended, with mild suprapubic tenderness. No rebound, rigidity, or guarding. BS+ X4. No organomegaly. Back: No bilateral CVA tenderness. Skin:  Normal turgor. Warm, dry, without visible rash, unless noted elsewhere. Neurological:  Alert and oriented. Motor functions intact. Responds to verbal commands.     Lab / Imaging Results   (All laboratory and radiology results have been personally reviewed by myself)  Labs:  Results for orders placed or performed in visit on 06/02/21   POCT Urinalysis no Micro   Result Value Ref Range    Color, UA yellow     Clarity, UA clear     Glucose, UA POC neg     Bilirubin, UA neg     Ketones, UA neg     Spec Grav, UA 1.025     Blood, UA POC neg     pH, UA 7.0     Protein, UA POC neg     Urobilinogen, UA 0.2     Leukocytes, UA small     Nitrite, UA neg        Imaging: All Radiology results interpreted by Radiologist unless otherwise noted. No orders to display     Assessment / Plan     Impression(s):  Maurine Scheuermann was seen today for urinary tract infection. Diagnoses and all orders for this visit:    Dysuria  -     POCT Urinalysis no Micro  -     Culture, Urine; Future  -     sulfamethoxazole-trimethoprim (BACTRIM DS;SEPTRA DS) 800-160 MG per tablet; Take 1 tablet by mouth 2 times daily for 5 days Avoid direct sun exposure when taking    Cystitis  -     sulfamethoxazole-trimethoprim (BACTRIM DS;SEPTRA DS) 800-160 MG per tablet; Take 1 tablet by mouth 2 times daily for 5 days Avoid direct sun exposure when taking      Disposition:  Disposition: Discharge to Home. UA appears suspicious  for a UTI. Urine C&S pending, will call with results once available. Script written for Bactrim DS BID for 5 days, side effects discussed and advised to avoid sun exposure when taking. Recommend to Increase fluids and rest.   F/u PCP in 1 week  if symptoms persist. ED sooner if symptoms worsen or change. ED immediately with the development of fever, shaking chills, body aches, flank pain, vomiting, CP, or SOB. Pt is in agreement with this care plan. All questions answered.      Nena Pinedo PA-C

## 2021-06-04 LAB
ORGANISM: ABNORMAL
URINE CULTURE, ROUTINE: ABNORMAL

## 2021-06-07 ENCOUNTER — OFFICE VISIT (OUTPATIENT)
Dept: FAMILY MEDICINE CLINIC | Age: 86
End: 2021-06-07
Payer: MEDICARE

## 2021-06-07 VITALS
RESPIRATION RATE: 16 BRPM | TEMPERATURE: 100.6 F | WEIGHT: 155.3 LBS | DIASTOLIC BLOOD PRESSURE: 52 MMHG | BODY MASS INDEX: 29.32 KG/M2 | HEIGHT: 61 IN | OXYGEN SATURATION: 97 % | HEART RATE: 78 BPM | SYSTOLIC BLOOD PRESSURE: 122 MMHG

## 2021-06-07 DIAGNOSIS — R50.9 FEVER, UNSPECIFIED FEVER CAUSE: ICD-10-CM

## 2021-06-07 DIAGNOSIS — R11.0 NAUSEA: ICD-10-CM

## 2021-06-07 DIAGNOSIS — R52 BODY ACHES: Primary | ICD-10-CM

## 2021-06-07 DIAGNOSIS — N30.90 CYSTITIS: ICD-10-CM

## 2021-06-07 DIAGNOSIS — R52 BODY ACHES: ICD-10-CM

## 2021-06-07 LAB
BILIRUBIN, POC: NEGATIVE
BLOOD URINE, POC: NORMAL
CLARITY, POC: CLEAR
COLOR, POC: YELLOW
GLUCOSE URINE, POC: NEGATIVE
INFLUENZA A ANTIGEN, POC: NEGATIVE
INFLUENZA B ANTIGEN, POC: NEGATIVE
KETONES, POC: NEGATIVE
LEUKOCYTE EST, POC: NEGATIVE
Lab: NORMAL
NITRITE, POC: NEGATIVE
PERFORMING INSTRUMENT: NORMAL
PH, POC: 7
PROTEIN, POC: NEGATIVE
QC PASS/FAIL: NORMAL
SARS-COV-2, POC: NORMAL
SPECIFIC GRAVITY, POC: 1.01
UROBILINOGEN, POC: 0.2

## 2021-06-07 PROCEDURE — G8427 DOCREV CUR MEDS BY ELIG CLIN: HCPCS | Performed by: PHYSICIAN ASSISTANT

## 2021-06-07 PROCEDURE — 1123F ACP DISCUSS/DSCN MKR DOCD: CPT | Performed by: PHYSICIAN ASSISTANT

## 2021-06-07 PROCEDURE — 1036F TOBACCO NON-USER: CPT | Performed by: PHYSICIAN ASSISTANT

## 2021-06-07 PROCEDURE — 99213 OFFICE O/P EST LOW 20 MIN: CPT | Performed by: PHYSICIAN ASSISTANT

## 2021-06-07 PROCEDURE — 81002 URINALYSIS NONAUTO W/O SCOPE: CPT | Performed by: PHYSICIAN ASSISTANT

## 2021-06-07 PROCEDURE — 4040F PNEUMOC VAC/ADMIN/RCVD: CPT | Performed by: PHYSICIAN ASSISTANT

## 2021-06-07 PROCEDURE — G8417 CALC BMI ABV UP PARAM F/U: HCPCS | Performed by: PHYSICIAN ASSISTANT

## 2021-06-07 PROCEDURE — 87804 INFLUENZA ASSAY W/OPTIC: CPT | Performed by: PHYSICIAN ASSISTANT

## 2021-06-07 PROCEDURE — 1090F PRES/ABSN URINE INCON ASSESS: CPT | Performed by: PHYSICIAN ASSISTANT

## 2021-06-07 PROCEDURE — 87426 SARSCOV CORONAVIRUS AG IA: CPT | Performed by: PHYSICIAN ASSISTANT

## 2021-06-07 RX ORDER — ONDANSETRON 4 MG/1
4 TABLET, ORALLY DISINTEGRATING ORAL EVERY 8 HOURS PRN
Qty: 12 TABLET | Refills: 0 | Status: SHIPPED
Start: 2021-06-07 | End: 2022-02-04 | Stop reason: ALTCHOICE

## 2021-06-07 NOTE — PROGRESS NOTES
Chief Complaint:   Nausea (started during the night, was hot and cold all day yesterday: This Am temp was 101.3)      History of Present Illness   Source of history provided by:  patient. Andres Merritt is a 80 y.o. old female who has a past medical history of:   Past Medical History:   Diagnosis Date    Arthritis     OSTEOPORASIS    Asthma     STABLE    CAD (coronary artery disease)     30% stenosis per cath 2007    Cancer [facility]) 2004    BREAST RIGHT    Cataracts, both eyes     Eczema     Hypothyroidism     Macular degeneration     Osteoarthritis     Rosacea     Thyroid disease     Presents to the walk in clinic with complaints of fever and body aches onset yesterday. The patient was recently at [facility] last week and she was given antibiotics for a suspected urinary infection. The urine culture did return and did show evidence of a urinary infection. She was given Bactrim twice daily for 5 days which she did complete her last dose this morning. She states that she has been drinking fluids and trying to eat as much as possible. She did just lose her  about 1 week ago and buried him on Friday. She states her urinary symptoms have improved, no further frequency, urgency or suprapubic pressure. She is still having some back \"soreness\", but not severe and denies any flank pain. She denies gross hematuria. She has had no associated  vaginal discharge or vaginal bleeding, but she has had some persistent nausea associated with her symptoms- no vomiting or diarrhea. She has been more lethargic than usual and has had some body aches and some chills and has not been able to stay warm. She has not tried to take any Tylenol for her symptoms. No LMP recorded. Patient has had a hysterectomy.       ROS    Unless otherwise stated in this report or unable to obtain because of the patient's clinical or mental status as evidenced by the medical record, this patients's positive and negative responses for Review of Systems, constitutional, psych, eyes, ENT, cardiovascular, respiratory, gastrointestinal, neurological, genitourinary, musculoskeletal, integument systems and systems related to the presenting problem are either stated in the preceding or were not pertinent or were negative for the symptoms and/or complaints related to the medical problem. Past Surgical history:   Past Surgical History:   Procedure Laterality Date    APPENDECTOMY      BACK SURGERY      LUMBAR FUSION    BREAST LUMPECTOMY Right     BREAST SURGERY      LUMPECTOMY RIGHT    CARDIAC CATHETERIZATION  03/2007    COLONOSCOPY      HYSTERECTOMY      JOINT REPLACEMENT  Jan. 30,2012.    bilateral knee replacements     Social History:  reports that she has never smoked. She has never used smokeless tobacco. She reports that she does not drink alcohol and does not use drugs. Family History: family history is not on file. Allergies: Penicillins, Chlorpheniramine maleate, Flu virus vaccine, and Protonix [pantoprazole sodium]    Physical Exam         VS:  BP (!) 122/52 (Site: Left Upper Arm, Position: Sitting, Cuff Size: Medium Adult)   Pulse 78   Temp 100.6 °F (38.1 °C) (Temporal)   Resp 16   Ht 5' 1\" (1.549 m)   Wt 155 lb 4.8 oz (70.4 kg)   SpO2 97%   BMI 29.34 kg/m²    Oxygen Saturation Interpretation: Normal.    Constitutional:  A&Ox3, development consistent with age, NAD. Ill appearing, but non toxic in appearance. Eyes: PERRLA, EOM's intact  Ears: TM's appear clear, no erythema or discharge  Nose: No discharge or bleeding  Mouth: Mucosa moist, no lesions. Lungs:  Clear to ausculation,  without wheezing, rales, or rhonchi. Heart:  Regular rhythm,  With no ectopy. Abdomen: Soft, nondistended, No rebound, rigidity, or guarding. BS+ X4. No organomegaly. Back: No bilateral CVA tenderness. Some mild paravertebral thoracic spine tenderness appreciated. Skin:  Normal turgor.   Warm and dry, without visible rash, unless noted elsewhere. Neurological:  Alert and oriented. Motor functions intact. Responds to verbal commands. Lab / Imaging Results   (All laboratory and radiology results have been personally reviewed by myself)  Labs:  Results for orders placed or performed in visit on 06/07/21   POCT Urinalysis no Micro   Result Value Ref Range    Color, UA yellow     Clarity, UA clear     Glucose, UA POC Negative     Bilirubin, UA Negative     Ketones, UA Negative     Spec Grav, UA 1.015     Blood, UA POC Trace-lysed     pH, UA 7.0     Protein, UA POC Negative     Urobilinogen, UA 0.2     Leukocytes, UA Negative     Nitrite, UA Negative    POCT COVID-19, Antigen   Result Value Ref Range    SARS-COV-2, POC Not-Detected Not Detected    Lot Number [id number]     QC Pass/Fail pass     Performing Instrument BD Veritor    POCT Influenza A/B Antigen (BD Veritor)   Result Value Ref Range    Inflenza A Ag negative     Influenza B Ag negative        Assessment / Plan     Impression(s):  Lottie De was seen today for nausea. Diagnoses and all orders for this visit:    Fever, unspecified fever cause  -     POCT Urinalysis no Micro  -     POCT COVID-19, Antigen  -     POCT Influenza A/B Antigen (BD Veritor)  -     Culture, Urine; Future    Body aches  -     POCT COVID-19, Antigen  -     POCT Influenza A/B Antigen (BD Veritor)  -     Culture, Urine; Future    Cystitis    Nausea  -     ondansetron (ZOFRAN ODT) 4 MG disintegrating tablet; Take 1 tablet by mouth every 8 hours as needed for Nausea      Disposition:  Disposition: Discharge to Home. Patient noted to have low grade fever on exam, Tylenol 1000mg given with glass of water. Patient did have COVID and Influenza swab done today in office and both negative. UA appears stable at current, patient just finished her last dose of oral Bactrim  for a UTI. Will send Urine for C&S and pending, will call with results once available.     Spoke with patient PCP in the office,  Miles Aviles and he agrees to await repeat urine culture and to hold off on additional antibiotic at this time. Patient currently with low grade fever. Patient was given Tylenol 1000mg orally on exam.  Patient advised to push water and oral fluids and encouraged to continue Tylenol every 4 to 6 hours as directed and to monitor her fever. She did have COVID and influenza swabs in office and both were negative. Script written for oral Zofran ODT, side effects discussed. F/u PCP in 3-5 days if symptoms persist. Recommend to ED sooner if symptoms worsen or change with development of fever, shaking chills, body aches, flank pain, vomiting, CP, or SOB. Pt is in agreement with this care plan. All questions answered.      Pamela Chowdary PA-C

## 2021-06-07 NOTE — PATIENT INSTRUCTIONS
Patient Education        Fever: Care Instructions  Overview     A fever is a high body temperature. It's one way your body fights illness. A temperature of up to 102°F can be helpful, because it helps the body respond to infection. Most healthy people can have a fever as high as 103°F to 104°F for short periods of time without problems. In most cases, a fever means that you have a minor illness. Follow-up care is a key part of your treatment and safety. Be sure to make and go to all appointments, and call your doctor if you are having problems. It's also a good idea to know your test results and keep a list of the medicines you take. How can you care for yourself at home? · Drink plenty of fluids to prevent dehydration. Choose water and other caffeine-free clear liquids. If you have to limit fluids because of a health problem, talk with your doctor before you increase the amount of fluids you drink. · Take an over-the-counter medicine, such as acetaminophen (Tylenol), ibuprofen (Advil, Motrin) or naproxen (Aleve), to relieve your symptoms. Read and follow all instructions on the label. No one younger than 20 should take aspirin. It has been linked to Reye syndrome, a serious illness. · Rest, and limit activity. · Wear lightweight clothing. · Eat mild foods, such as soup. When should you call for help? Call your doctor now or seek immediate medical care if:    · You have a fever of 104°F or higher.     · You have a fever that stays high.     · You have a fever and feel confused or often feel dizzy.     · You have trouble breathing.     · You have a fever with a stiff neck or a severe headache. Watch closely for changes in your health, and be sure to contact your doctor if:    · You have any problems with your medicine, or you get a fever after starting a new medicine.     · You do not get better as expected. Where can you learn more? Go to https://chpekacyeb.[facility]. org and sign in to your [employer] account. Enter F025 in the [facility] box to learn more about \"Fever: Care Instructions. \"     If you do not have an account, please click on the \"Sign Up Now\" link. Current as of: July 21, 2020               Content Version: 12.8  © 2006-2021 [employer]. Care instructions adapted under license by [facility] ([facility]). If you have questions about a medical condition or this instruction, always ask your healthcare professional. [place] [address] any warranty or liability for your use of this information. Patient Education        Urinary Tract Infection (UTI) in Women: Care Instructions  Overview     A urinary tract infection, or UTI, is a general term for an infection anywhere between the kidneys and the urethra (where urine comes out). Most UTIs are bladder infections. They often cause pain or burning when you urinate. UTIs are caused by bacteria and can be cured with antibiotics. Be sure to complete your treatment so that the infection does not get worse. Follow-up care is a key part of your treatment and safety. Be sure to make and go to all appointments, and call your doctor if you are having problems. It's also a good idea to know your test results and keep a list of the medicines you take. How can you care for yourself at home? · Take your antibiotics as directed. Do not stop taking them just because you feel better. You need to take the full course of antibiotics. · Drink extra water and other fluids for the next day or two. This will help make the urine less concentrated and help wash out the bacteria that are causing the infection. (If you have kidney, heart, or liver disease and have to limit fluids, talk with your doctor before you increase the amount of fluids you drink.)  · Avoid drinks that are carbonated or have caffeine. They can irritate the bladder. · Urinate often. Try to empty your bladder each time.   · To relieve pain, take a hot bath or lay a heating pad set on low over your lower belly or genital area. Never go to sleep with a heating pad in place. To prevent UTIs  · Drink plenty of water each day. This helps you urinate often, which clears bacteria from your system. (If you have kidney, heart, or liver disease and have to limit fluids, talk with your doctor before you increase the amount of fluids you drink.)  · Urinate when you need to. · If you are sexually active, urinate right after you have sex. · Change sanitary pads often. · Avoid douches, bubble baths, feminine hygiene sprays, and other feminine hygiene products that have deodorants. · After going to the bathroom, wipe from front to back. When should you call for help? Call your doctor now or seek immediate medical care if:    · Symptoms such as fever, chills, nausea, or vomiting get worse or appear for the first time.     · You have new pain in your back just below your rib cage. This is called flank pain.     · There is new blood or pus in your urine.     · You have any problems with your antibiotic medicine. Watch closely for changes in your health, and be sure to contact your doctor if:    · You are not getting better after taking an antibiotic for 2 days.     · Your symptoms go away but then come back. Where can you learn more? Go to https://[employer]annaheb."[employer]. org and sign in to your [employer] account. Enter R155 in the [facility] box to learn more about \"Urinary Tract Infection (UTI) in Women: Care Instructions. \"     If you do not have an account, please click on the \"Sign Up Now\" link. Current as of: June 29, 2020               Content Version: 12.8  © 9786-3264 [employer]. Care instructions adapted under license by [address]  If you have questions about a medical condition or this instruction, always ask your healthcare professional. [address]  any warranty or liability for your use of this information.

## 2021-06-09 ENCOUNTER — TELEPHONE (OUTPATIENT)
Dept: FAMILY MEDICINE CLINIC | Age: 86
End: 2021-06-09

## 2021-06-09 LAB — URINE CULTURE, ROUTINE: NORMAL

## 2021-06-09 NOTE — TELEPHONE ENCOUNTER
Call placed to patient about her urine culture. No answer to patient home phone. Left patient a message to return call.     Apoorva Salazar PA-C

## 2021-06-09 NOTE — TELEPHONE ENCOUNTER
Spoke with patient at length concerning her urine culture and advised her of no significant growth. She reports that she is feeling improved and her temperature today has been 98.6 and improved. She is eating and drinking and feeling much better. She has been resting much better and no chills or body aches. She was advised that I can arrange a follow up with Dr. Celena Jones who is partner with Dr. Lázaro June to see her sooner for evaluation to make sure that everything is doing ok and she will think about it and she has the contact information to get in touch with us to do so or she can reach out to Dr. Lázaro June office as well.     Patrica Campos PA-C

## 2021-06-17 RX ORDER — MELATONIN
1000 DAILY
Qty: 90 TABLET | Refills: 1 | Status: SHIPPED
Start: 2021-06-17 | End: 2021-12-01 | Stop reason: SDUPTHER

## 2021-08-09 RX ORDER — OMEPRAZOLE 40 MG/1
40 CAPSULE, DELAYED RELEASE ORAL DAILY
Qty: 90 CAPSULE | Refills: 1 | Status: SHIPPED
Start: 2021-08-09 | End: 2021-11-30 | Stop reason: SDUPTHER

## 2021-08-09 NOTE — TELEPHONE ENCOUNTER
----- Message from Des Mcelroy sent at 8/9/2021  7:12 AM EDT -----  Subject: Refill Request    QUESTIONS  Name of Medication? omeprazole (PRILOSEC) 40 MG delayed release capsule  Patient-reported dosage and instructions? 40MG - 1x a day  How many days do you have left? 3  Preferred Pharmacy? [id number]  Pharmacy phone number (if available)? [phone]  Additional Information for Provider? Usually gets a 90 days   ---------------------------------------------------------------------------  --------------  CALL BACK INFO  What is the best way for the office to contact you? OK to leave message on   voicemail  Preferred Call Back Phone Number?  [phone]

## 2021-09-01 ENCOUNTER — TELEPHONE (OUTPATIENT)
Dept: FAMILY MEDICINE CLINIC | Age: 86
End: 2021-09-01

## 2021-09-01 NOTE — TELEPHONE ENCOUNTER
----- Message from Cornelio Market sent at 9/1/2021  9:01 AM EDT -----  Subject: Message to Provider    QUESTIONS  Information for Provider? Patient states that she would like to know if   she needs to schedule an appointment for her blood work or if she can just   come in.   ---------------------------------------------------------------------------  --------------  [address]  What is the best way for the office to contact you? OK to leave message on   voicemail  Preferred Call Back Phone Number? [phone]  ---------------------------------------------------------------------------  --------------  SCRIPT ANSWERS  Relationship to Patient?  Self

## 2021-09-30 ENCOUNTER — OFFICE VISIT (OUTPATIENT)
Dept: FAMILY MEDICINE CLINIC | Age: 86
End: 2021-09-30
Payer: MEDICARE

## 2021-09-30 VITALS
OXYGEN SATURATION: 99 % | SYSTOLIC BLOOD PRESSURE: 138 MMHG | WEIGHT: 143 LBS | HEART RATE: 58 BPM | DIASTOLIC BLOOD PRESSURE: 70 MMHG | TEMPERATURE: 97.5 F | BODY MASS INDEX: 27.02 KG/M2

## 2021-09-30 DIAGNOSIS — D72.819 LEUKOPENIA, UNSPECIFIED TYPE: ICD-10-CM

## 2021-09-30 DIAGNOSIS — E04.9 ADENOMATOUS GOITER: Primary | ICD-10-CM

## 2021-09-30 DIAGNOSIS — I44.7 LEFT BUNDLE BRANCH BLOCK: ICD-10-CM

## 2021-09-30 DIAGNOSIS — J45.909 MODERATE ASTHMA WITHOUT COMPLICATION, UNSPECIFIED WHETHER PERSISTENT: Chronic | ICD-10-CM

## 2021-09-30 DIAGNOSIS — Z23 NEED FOR VACCINATION: ICD-10-CM

## 2021-09-30 DIAGNOSIS — H35.30 MACULAR DEGENERATION, UNSPECIFIED LATERALITY, UNSPECIFIED TYPE: ICD-10-CM

## 2021-09-30 DIAGNOSIS — Z85.3 HISTORY OF RIGHT BREAST CANCER: ICD-10-CM

## 2021-09-30 DIAGNOSIS — E03.9 HYPOTHYROIDISM, UNSPECIFIED TYPE: ICD-10-CM

## 2021-09-30 DIAGNOSIS — K21.9 GASTROESOPHAGEAL REFLUX DISEASE WITHOUT ESOPHAGITIS: ICD-10-CM

## 2021-09-30 PROCEDURE — G8427 DOCREV CUR MEDS BY ELIG CLIN: HCPCS | Performed by: INTERNAL MEDICINE

## 2021-09-30 PROCEDURE — G8417 CALC BMI ABV UP PARAM F/U: HCPCS | Performed by: INTERNAL MEDICINE

## 2021-09-30 PROCEDURE — G0009 ADMIN PNEUMOCOCCAL VACCINE: HCPCS | Performed by: INTERNAL MEDICINE

## 2021-09-30 PROCEDURE — 4040F PNEUMOC VAC/ADMIN/RCVD: CPT | Performed by: INTERNAL MEDICINE

## 2021-09-30 PROCEDURE — 99214 OFFICE O/P EST MOD 30 MIN: CPT | Performed by: INTERNAL MEDICINE

## 2021-09-30 PROCEDURE — 1123F ACP DISCUSS/DSCN MKR DOCD: CPT | Performed by: INTERNAL MEDICINE

## 2021-09-30 PROCEDURE — 1036F TOBACCO NON-USER: CPT | Performed by: INTERNAL MEDICINE

## 2021-09-30 PROCEDURE — 90670 PCV13 VACCINE IM: CPT | Performed by: INTERNAL MEDICINE

## 2021-09-30 PROCEDURE — 1090F PRES/ABSN URINE INCON ASSESS: CPT | Performed by: INTERNAL MEDICINE

## 2021-09-30 RX ORDER — FAMOTIDINE 20 MG/1
20 TABLET, FILM COATED ORAL NIGHTLY
Qty: 90 TABLET | Refills: 1 | Status: SHIPPED
Start: 2021-09-30 | End: 2021-11-30 | Stop reason: SDUPTHER

## 2021-09-30 RX ORDER — LEVOTHYROXINE SODIUM 0.03 MG/1
TABLET ORAL
Qty: 135 TABLET | Refills: 1 | Status: SHIPPED | OUTPATIENT
Start: 2021-09-30 | End: 2021-11-30 | Stop reason: SDUPTHER

## 2021-09-30 NOTE — PROGRESS NOTES
Subjective:     Chief Complaint   Patient presents with    Thyroid Problem   Patient is here for follow-up on her thyroid,  Follow-up on the asthma,    Has occasional heartburns, denies any dysphagia,    History of macular degeneration she follows with ophthalmologist she got the injection,    History of right breast cancer status post lumpectomy diagnosed in 2004 she follows with Dr. Sandra Hoff    Past Medical History:   Diagnosis Date    Arthritis     OSTEOPORASIS    Asthma     STABLE    CAD (coronary artery disease)     30% stenosis per cath 2007    Cancer [facility]) 2004    BREAST RIGHT    Cataracts, both eyes     Eczema     Hypothyroidism     Macular degeneration     Osteoarthritis     Rosacea     Thyroid disease         Social History     Socioeconomic History    Marital status:      Spouse name: Not on file    Number of children: Not on file    Years of education: Not on file    Highest education level: Not on file   Occupational History    Not on file   Tobacco Use    Smoking status: Never Smoker    Smokeless tobacco: Never Used   Substance and Sexual Activity    Alcohol use: No    Drug use: No    Sexual activity: Not on file   Other Topics Concern    Not on file   Social History Narrative    Not on file     Social Determinants of Health     Financial Resource Strain:     Difficulty of Paying Living Expenses:    Food Insecurity: No Food Insecurity    Worried About Running Out of Food in the Last Year: Never true    Duy of Food in the Last Year: Never true   Transportation Needs: No Transportation Needs    Lack of Transportation (Medical): No    Lack of Transportation (Non-Medical):  No   Physical Activity:     Days of Exercise per Week:     Minutes of Exercise per Session:    Stress:     Feeling of Stress :    Social Connections:     Frequency of Communication with Friends and Family:     Frequency of Social Gatherings with Friends and Family:     Attends Zoroastrianism Services:     Active Member of Clubs or Organizations:     Attends Club or Organization Meetings:     Marital Status:    Intimate Partner Violence:     Fear of Current or Ex-Partner:     Emotionally Abused:     Physically Abused:     Sexually Abused:         Past Surgical History:   Procedure Laterality Date    APPENDECTOMY      BACK SURGERY      LUMBAR FUSION    BREAST LUMPECTOMY Right     BREAST SURGERY      LUMPECTOMY RIGHT    CARDIAC CATHETERIZATION  03/2007    COLONOSCOPY      HYSTERECTOMY      JOINT REPLACEMENT  Jan. 30,2012.    bilateral knee replacements        History reviewed. No pertinent family history. Allergies   Allergen Reactions    Penicillins Rash    Chlorpheniramine Maleate      SPLENDA, FLU SHOT    Flu Virus Vaccine Swelling    Protonix [Pantoprazole Sodium]         ROS  No acute distress  Cardiac: Denies any chest pain or palpitation  Cardiac cath in 2007  LAD 30%  Stress test 2016 was okay  Chronic left bundle branch block  Has appointment to see Dr. Fatmata Agrawal next month  Respiratory: Denies any cough or shortness of breath  History of asthma stable she also follows with Dr. Connor Mcclain seen by him in August 2021  GI: No abdominal pain. Denies any nausea vomiting or diarrhea  Colonoscopy August 2018 Dr. John Coles repeat as needed for any symptoms per GI  : Denies any dysuria frequency or hematuria  Neuro: No headache or dizziness  Endocrine: No diabetes  Skin: normal  No recent weight gain or weight loss  Denies any change in vision    Objective:    /70   Pulse 58   Temp 97.5 °F (36.4 °C)   Wt 143 lb (64.9 kg)   SpO2 99%   BMI 27.02 kg/m²     Constitutional: Alert awake and oriented  Eyes: Pupils equal bilaterally. Extraocular muscles intact  Neck: no JVD adenopathy no bruit  Possible thyroid goiter  Heart:  RRR, no murmurs, gallops, or rubs.   Lungs:    no wheeze, rales or rhonchi  Abd: bowel sounds present, nontender, nondistended, no masses  Extrem:  No clubbing, cyanosis, or edema  Neuro: AAOx3,No Focal deficit  Psychological: no depression or anxiety       Current Outpatient Medications   Medication Sig Dispense Refill    famotidine (PEPCID) 20 MG tablet Take 1 tablet by mouth nightly 90 tablet 1    levothyroxine (SYNTHROID) 25 MCG tablet One and one half tablets daily 135 tablet 1    omeprazole (PRILOSEC) 40 MG delayed release capsule Take 1 capsule by mouth daily 90 capsule 1    vitamin D3 (CHOLECALCIFEROL) 25 MCG (1000 UT) TABS tablet Take 1 tablet by mouth daily 90 tablet 1    ondansetron (ZOFRAN ODT) 4 MG disintegrating tablet Take 1 tablet by mouth every 8 hours as needed for Nausea 12 tablet 0    albuterol (PROVENTIL) (2.5 MG/3ML) 0.083% nebulizer solution Take 2.5 mg by nebulization every 6 hours as needed.  fluticasone (FLONASE) 50 MCG/ACT nasal spray 1 spray by Nasal route nightly.  calcium carbonate 600 MG TABS tablet Take 1 tablet by mouth 4 times daily. No current facility-administered medications for this visit.         Last 3 BMP  Lab Results   Component Value Date/Time     09/13/2021 09:45 AM     01/18/2021 08:31 AM     04/01/2014 01:20 PM    K 4.4 09/13/2021 09:45 AM    K 4.8 01/18/2021 08:31 AM    K 3.8 04/01/2014 01:20 PM     09/13/2021 09:45 AM     01/18/2021 08:31 AM     04/01/2014 01:20 PM    CO2 27 09/13/2021 09:45 AM    CO2 23 01/18/2021 08:31 AM    CO2 25 04/01/2014 01:20 PM    BUN 17 09/13/2021 09:45 AM    BUN 21 01/18/2021 08:31 AM    BUN 13 04/01/2014 01:20 PM    CREATININE 0.8 09/13/2021 09:45 AM    CREATININE 1.0 01/18/2021 08:31 AM    CREATININE 1.1 (H) 04/01/2014 01:20 PM    GLUCOSE 93 09/13/2021 09:45 AM    GLUCOSE 103 (H) 01/18/2021 08:31 AM    GLUCOSE 121 (H) 04/01/2014 01:20 PM    GLUCOSE 90 02/09/2012 05:20 AM    GLUCOSE 108 02/06/2012 05:10 AM    GLUCOSE 91 02/03/2012 05:08 AM    CALCIUM 9.5 09/13/2021 09:45 AM    CALCIUM 10.0 01/18/2021 08:31 AM    CALCIUM 9.0 04/01/2014 01:20 PM       Last 3 CMP:    Lab Results   Component Value Date/Time     09/13/2021 09:45 AM     01/18/2021 08:31 AM     04/01/2014 01:20 PM    K 4.4 09/13/2021 09:45 AM    K 4.8 01/18/2021 08:31 AM    K 3.8 04/01/2014 01:20 PM     09/13/2021 09:45 AM     01/18/2021 08:31 AM     04/01/2014 01:20 PM    CO2 27 09/13/2021 09:45 AM    CO2 23 01/18/2021 08:31 AM    CO2 25 04/01/2014 01:20 PM    BUN 17 09/13/2021 09:45 AM    BUN 21 01/18/2021 08:31 AM    BUN 13 04/01/2014 01:20 PM    CREATININE 0.8 09/13/2021 09:45 AM    CREATININE 1.0 01/18/2021 08:31 AM    CREATININE 1.1 (H) 04/01/2014 01:20 PM    GLUCOSE 93 09/13/2021 09:45 AM    GLUCOSE 103 (H) 01/18/2021 08:31 AM    GLUCOSE 121 (H) 04/01/2014 01:20 PM    GLUCOSE 90 02/09/2012 05:20 AM    GLUCOSE 108 02/06/2012 05:10 AM    GLUCOSE 91 02/03/2012 05:08 AM    CALCIUM 9.5 09/13/2021 09:45 AM    CALCIUM 10.0 01/18/2021 08:31 AM    CALCIUM 9.0 04/01/2014 01:20 PM    PROT 6.5 09/13/2021 09:45 AM    PROT 7.2 01/18/2021 08:31 AM    PROT 5.0 (L) 02/01/2012 04:20 AM    LABALBU 4.0 09/13/2021 09:45 AM    LABALBU 4.3 01/18/2021 08:31 AM    LABALBU 2.9 (L) 02/01/2012 04:20 AM    BILITOT 0.4 09/13/2021 09:45 AM    BILITOT 0.6 01/18/2021 08:31 AM    BILITOT 0.4 02/01/2012 04:20 AM    ALKPHOS 71 09/13/2021 09:45 AM    ALKPHOS 68 01/18/2021 08:31 AM    ALKPHOS 36 (L) 02/01/2012 04:20 AM    AST 19 09/13/2021 09:45 AM    AST 16 01/18/2021 08:31 AM    AST 14 02/01/2012 04:20 AM    ALT 11 09/13/2021 09:45 AM    ALT 12 01/18/2021 08:31 AM    ALT 11 02/01/2012 04:20 AM        CBC:   Lab Results   Component Value Date/Time    WBC 3.3 (L) 09/13/2021 09:45 AM    RBC 4.18 09/13/2021 09:45 AM    HGB 13.3 09/13/2021 09:45 AM    HCT 39.9 09/13/2021 09:45 AM    MCV 95.5 09/13/2021 09:45 AM    MCH 31.8 09/13/2021 09:45 AM    MCHC 33.3 09/13/2021 09:45 AM    RDW 14.0 09/13/2021 09:45 AM     09/13/2021 09:45 AM    MPV 10.7 09/13/2021 09:45 AM       A1C:  Lab

## 2021-11-05 ENCOUNTER — IMMUNIZATION (OUTPATIENT)
Dept: PRIMARY CARE CLINIC | Age: 86
End: 2021-11-05
Payer: MEDICARE

## 2021-11-05 PROCEDURE — 91306 COVID-19, MODERNA BOOSTER VACCINE 0.25ML DOSE: CPT | Performed by: NURSE PRACTITIONER

## 2021-11-05 PROCEDURE — 0064A COVID-19, MODERNA BOOSTER VACCINE 0.25ML DOSE: CPT | Performed by: NURSE PRACTITIONER

## 2021-11-30 RX ORDER — LEVOTHYROXINE SODIUM 0.03 MG/1
TABLET ORAL
Qty: 135 TABLET | Refills: 1 | Status: SHIPPED | OUTPATIENT
Start: 2021-11-30

## 2021-11-30 RX ORDER — OMEPRAZOLE 40 MG/1
40 CAPSULE, DELAYED RELEASE ORAL DAILY
Qty: 90 CAPSULE | Refills: 1 | Status: SHIPPED
Start: 2021-11-30 | End: 2022-01-03 | Stop reason: SDUPTHER

## 2021-11-30 RX ORDER — FAMOTIDINE 20 MG/1
20 TABLET, FILM COATED ORAL NIGHTLY
Qty: 90 TABLET | Refills: 1 | Status: SHIPPED | OUTPATIENT
Start: 2021-11-30

## 2021-12-01 RX ORDER — MELATONIN
1000 DAILY
Qty: 90 TABLET | Refills: 1 | Status: SHIPPED | OUTPATIENT
Start: 2021-12-01

## 2021-12-03 ENCOUNTER — TELEPHONE (OUTPATIENT)
Dept: FAMILY MEDICINE CLINIC | Age: 86
End: 2021-12-03

## 2021-12-03 DIAGNOSIS — J01.90 ACUTE SINUSITIS, RECURRENCE NOT SPECIFIED, UNSPECIFIED LOCATION: ICD-10-CM

## 2021-12-03 DIAGNOSIS — J01.90 ACUTE SINUSITIS, RECURRENCE NOT SPECIFIED, UNSPECIFIED LOCATION: Primary | ICD-10-CM

## 2021-12-03 RX ORDER — AZITHROMYCIN 250 MG/1
250 TABLET, FILM COATED ORAL SEE ADMIN INSTRUCTIONS
Qty: 6 TABLET | Refills: 0 | Status: SHIPPED | OUTPATIENT
Start: 2021-12-03 | End: 2021-12-08

## 2021-12-03 RX ORDER — DEXTROMETHORPHAN HYDROBROMIDE AND PROMETHAZINE HYDROCHLORIDE 15; 6.25 MG/5ML; MG/5ML
5 SYRUP ORAL 4 TIMES DAILY PRN
Qty: 150 ML | Refills: 0 | Status: SHIPPED | OUTPATIENT
Start: 2021-12-03 | End: 2021-12-10

## 2021-12-03 NOTE — TELEPHONE ENCOUNTER
I spoke to patient, Zithromax and Promethazine DM sent to pharmacy, Covid test ordered she will do it today  Go to urgent care if worse

## 2021-12-04 LAB — SARS-COV-2, PCR: NOT DETECTED

## 2021-12-22 ENCOUNTER — TELEPHONE (OUTPATIENT)
Dept: FAMILY MEDICINE CLINIC | Age: 86
End: 2021-12-22

## 2021-12-22 NOTE — TELEPHONE ENCOUNTER
----- Message from Gina Rodriguez sent at 12/22/2021  8:24 AM EST -----  Subject: Appointment Request    Reason for Call: Routine Existing Condition Follow Up    QUESTIONS  Type of Appointment? Established Patient  Reason for appointment request? Available appointments did not meet   patient need  Additional Information for Provider? Patient would like to have an appt   scheduled prior to Dr. Camelia Simpson group home date. Next available appt   not until 2/28/21 with the NP. Please call patient and advise.  ---------------------------------------------------------------------------  --------------  CALL BACK INFO  What is the best way for the office to contact you? OK to leave message on   voicemail  Preferred Call Back Phone Number? [phone]  ---------------------------------------------------------------------------  --------------  SCRIPT ANSWERS  Relationship to Patient? Self  Have your symptoms changed? No  Is this follow up request related to routine Diabetes Management? No  Have you been diagnosed with, awaiting test results for, or told that you   are suspected of having COVID-19 (Coronavirus)? (If patient has tested   negative or was tested as a requirement for work, school, or travel and   not based on symptoms, answer no)? No  Within the past two weeks have you developed any of the following symptoms   (answer no if symptoms have been present longer than 2 weeks or began   more than 2 weeks ago)? Fever or Chills, Cough, Shortness of breath or   difficulty breathing, Loss of taste or smell, Sore throat, Nasal   congestion, Sneezing or runny nose, Fatigue or generalized body aches   (answer no if pain is specific to a body part e.g. back pain), Diarrhea,   Headache? No  Have you had close contact with someone with COVID-19 in the last 14 days? No  (Service Expert - click yes below to proceed with [employer] As Usual   Scheduling)?  Yes

## 2021-12-29 ENCOUNTER — TELEPHONE (OUTPATIENT)
Dept: FAMILY MEDICINE CLINIC | Age: 86
End: 2021-12-29

## 2021-12-29 DIAGNOSIS — J40 BRONCHITIS: Primary | ICD-10-CM

## 2021-12-29 NOTE — TELEPHONE ENCOUNTER
Patient is still complaining of cough. She was given 2 separate medrol dose pack by dr Moses Aguirre.  She has had 2 negative covid tests

## 2021-12-29 NOTE — TELEPHONE ENCOUNTER
I spoke to patient, she has been using prednisone nebulizer, she was traveling to [place], advised to repeat COVID-19 test and a chest x-ray tomorrow, urgent care if worse

## 2021-12-30 DIAGNOSIS — J40 BRONCHITIS: ICD-10-CM

## 2021-12-31 LAB
SARS-COV-2: NOT DETECTED
SOURCE: NORMAL

## 2022-01-03 ENCOUNTER — OFFICE VISIT (OUTPATIENT)
Dept: FAMILY MEDICINE CLINIC | Age: 87
End: 2022-01-03
Payer: MEDICARE

## 2022-01-03 VITALS
TEMPERATURE: 97.7 F | OXYGEN SATURATION: 96 % | SYSTOLIC BLOOD PRESSURE: 136 MMHG | BODY MASS INDEX: 27.96 KG/M2 | HEART RATE: 78 BPM | WEIGHT: 148 LBS | DIASTOLIC BLOOD PRESSURE: 70 MMHG

## 2022-01-03 DIAGNOSIS — E03.9 HYPOTHYROIDISM, UNSPECIFIED TYPE: ICD-10-CM

## 2022-01-03 DIAGNOSIS — Z85.3 HISTORY OF RIGHT BREAST CANCER: ICD-10-CM

## 2022-01-03 DIAGNOSIS — H35.30 MACULAR DEGENERATION, UNSPECIFIED LATERALITY, UNSPECIFIED TYPE: ICD-10-CM

## 2022-01-03 DIAGNOSIS — I44.7 LEFT BUNDLE BRANCH BLOCK: ICD-10-CM

## 2022-01-03 DIAGNOSIS — J45.901 MODERATE ASTHMA WITH EXACERBATION, UNSPECIFIED WHETHER PERSISTENT: Primary | ICD-10-CM

## 2022-01-03 DIAGNOSIS — K21.9 GASTROESOPHAGEAL REFLUX DISEASE WITHOUT ESOPHAGITIS: ICD-10-CM

## 2022-01-03 DIAGNOSIS — J01.90 ACUTE SINUSITIS, RECURRENCE NOT SPECIFIED, UNSPECIFIED LOCATION: ICD-10-CM

## 2022-01-03 PROBLEM — D70.9 NEUTROPENIA, UNSPECIFIED TYPE (HCC): Status: ACTIVE | Noted: 2022-01-03

## 2022-01-03 PROCEDURE — 1090F PRES/ABSN URINE INCON ASSESS: CPT | Performed by: INTERNAL MEDICINE

## 2022-01-03 PROCEDURE — G8417 CALC BMI ABV UP PARAM F/U: HCPCS | Performed by: INTERNAL MEDICINE

## 2022-01-03 PROCEDURE — G8427 DOCREV CUR MEDS BY ELIG CLIN: HCPCS | Performed by: INTERNAL MEDICINE

## 2022-01-03 PROCEDURE — 99214 OFFICE O/P EST MOD 30 MIN: CPT | Performed by: INTERNAL MEDICINE

## 2022-01-03 PROCEDURE — 1123F ACP DISCUSS/DSCN MKR DOCD: CPT | Performed by: INTERNAL MEDICINE

## 2022-01-03 PROCEDURE — 4040F PNEUMOC VAC/ADMIN/RCVD: CPT | Performed by: INTERNAL MEDICINE

## 2022-01-03 PROCEDURE — G8484 FLU IMMUNIZE NO ADMIN: HCPCS | Performed by: INTERNAL MEDICINE

## 2022-01-03 PROCEDURE — 1036F TOBACCO NON-USER: CPT | Performed by: INTERNAL MEDICINE

## 2022-01-03 RX ORDER — OMEPRAZOLE 40 MG/1
40 CAPSULE, DELAYED RELEASE ORAL DAILY
Qty: 90 CAPSULE | Refills: 1 | Status: SHIPPED | OUTPATIENT
Start: 2022-01-03

## 2022-01-03 RX ORDER — LEVOFLOXACIN 500 MG/1
500 TABLET, FILM COATED ORAL DAILY
Qty: 10 TABLET | Refills: 0 | Status: SHIPPED | OUTPATIENT
Start: 2022-01-03 | End: 2022-01-13

## 2022-01-03 NOTE — PROGRESS NOTES
Subjective:     Chief Complaint   Patient presents with    Cough   Patient has been having cough and wheezing for the past 4 weeks, she had Covid test done twice which was negative, she had a chest x-ray done December negative, she has cough with expectoration clear to slightly yellow    She has seen pulmonologist Dr. Connor Almanzar, who gave her prednisone which helped the symptoms came back again, she took 2 courses of prednisone,    She has runny nose stuffy nose postnasal drip, she has to spit out slightly yellow cold,  She has been using nebulizer, she was on the Symbicort in the past she stopped it, she is still taking the Singulair,    She has been using Flonase nose spray      Past Medical History:   Diagnosis Date    Arthritis     OSTEOPORASIS    Asthma     STABLE    CAD (coronary artery disease)     30% stenosis per cath 2007    Cancer [facility]) 2004    BREAST RIGHT    Cataracts, both eyes     Eczema     Hypothyroidism     Macular degeneration     Osteoarthritis     Rosacea     Thyroid disease         Social History     Socioeconomic History    Marital status:      Spouse name: Not on file    Number of children: Not on file    Years of education: Not on file    Highest education level: Not on file   Occupational History    Not on file   Tobacco Use    Smoking status: Never Smoker    Smokeless tobacco: Never Used   Substance and Sexual Activity    Alcohol use: No    Drug use: No    Sexual activity: Not on file   Other Topics Concern    Not on file   Social History Narrative    Not on file     Social Determinants of Health     Financial Resource Strain:     Difficulty of Paying Living Expenses: Not on file   Food Insecurity: No Food Insecurity    Worried About [facility] in the Last Year: Never true    Duy of Food in the Last Year: Never true   Transportation Needs: No Transportation Needs    Lack of Transportation (Medical): No    Lack of Transportation (Non-Medical):  No Physical Activity:     Days of Exercise per Week: Not on file    Minutes of Exercise per Session: Not on file   Stress:     Feeling of Stress : Not on file   Social Connections:     Frequency of Communication with Friends and Family: Not on file    Frequency of Social Gatherings with Friends and Family: Not on file    Attends Mu-ism Services: Not on file    Active Member of Clubs or Organizations: Not on file    Attends Club or Organization Meetings: Not on file    Marital Status: Not on file   Intimate Partner Violence:     Fear of Current or Ex-Partner: Not on file    Emotionally Abused: Not on file    Physically Abused: Not on file    Sexually Abused: Not on file   Housing Stability:     Unable to Pay for Housing in the Last Year: Not on file    Number of [place] in the Last Year: Not on file    Unstable Housing in the Last Year: Not on file        Past Surgical History:   Procedure Laterality Date    APPENDECTOMY      BACK SURGERY      LUMBAR FUSION    BREAST LUMPECTOMY Right     BREAST SURGERY      LUMPECTOMY RIGHT    CARDIAC CATHETERIZATION  03/2007    COLONOSCOPY      HYSTERECTOMY      JOINT REPLACEMENT  Jan. 30,2012.    bilateral knee replacements        History reviewed. No pertinent family history. Allergies   Allergen Reactions    Penicillins Rash    Chlorpheniramine Maleate      SPLENDA, FLU SHOT    Influenza Virus Vaccine Swelling    Protonix [Pantoprazole Sodium]         ROS  No acute distress  Cardiac: Denies any chest pain or palpitation  Cardiac cath in 2007  LAD 30%  Stress test 2016 was okay  Chronic left bundle branch block  Sees Dr. Casey Quispe and another 5  Respiratory: Denies any cough or shortness of breath  History of asthma stable she also follows with Dr. Mitali Shah seen by him in August 2021  Asthma flareup recently since 4  GI: No abdominal pain.  Denies any nausea vomiting or diarrhea  Colonoscopy August 2018 Dr. April Martinez repeat as needed for any symptoms per GI  : Denies any dysuria frequency or hematuria  Neuro: No headache or dizziness  Endocrine: No diabetes  Skin: normal  No recent weight gain or weight loss  Denies any change in vision    Objective:    /70   Pulse 78   Temp 97.7 °F (36.5 °C)   Wt 148 lb (67.1 kg)   SpO2 96%   BMI 27.96 kg/m²     Constitutional: Alert awake and oriented  Eyes: Pupils equal bilaterally. Extraocular muscles intact  Neck: no JVD adenopathy no bruit  Heart:  RRR, no murmurs, gallops, or rubs. Lungs:    Decreased breath sounds with scattered rhonchi bilaterally no crackles  Abd: bowel sounds present, nontender, nondistended, no masses  Extrem:  No clubbing, cyanosis, or edema  No edema, no swelling, no calf tenderness  Neuro: AAOx3,No Focal deficit  Psychological: no depression or anxiety       Current Outpatient Medications   Medication Sig Dispense Refill    omeprazole (PRILOSEC) 40 MG delayed release capsule Take 1 capsule by mouth daily 90 capsule 1    levoFLOXacin (LEVAQUIN) 500 MG tablet Take 1 tablet by mouth daily for 10 days 10 tablet 0    prednisoLONE 5 MG TABS Take 1 tablet by mouth See Admin Instructions for 12 days 2 tablets twice daily for 3 days, 1 tablet three   Times daily for 3 days, 1 tablet twice daily for 3 days,  1 tablet daily for 3 days 30 tablet 0    vitamin D3 (CHOLECALCIFEROL) 25 MCG (1000 UT) TABS tablet Take 1 tablet by mouth daily 90 tablet 1    levothyroxine (SYNTHROID) 25 MCG tablet One and one half tablets daily 135 tablet 1    famotidine (PEPCID) 20 MG tablet Take 1 tablet by mouth nightly 90 tablet 1    ondansetron (ZOFRAN ODT) 4 MG disintegrating tablet Take 1 tablet by mouth every 8 hours as needed for Nausea 12 tablet 0    albuterol (PROVENTIL) (2.5 MG/3ML) 0.083% nebulizer solution Take 2.5 mg by nebulization every 6 hours as needed.  fluticasone (FLONASE) 50 MCG/ACT nasal spray 1 spray by Nasal route nightly.         calcium carbonate 600 MG TABS tablet Take 1 tablet by mouth 4 times daily. No current facility-administered medications for this visit.         Last 3 BMP  Lab Results   Component Value Date/Time     09/13/2021 09:45 AM     01/18/2021 08:31 AM     04/01/2014 01:20 PM    K 4.4 09/13/2021 09:45 AM    K 4.8 01/18/2021 08:31 AM    K 3.8 04/01/2014 01:20 PM     09/13/2021 09:45 AM     01/18/2021 08:31 AM     04/01/2014 01:20 PM    CO2 27 09/13/2021 09:45 AM    CO2 23 01/18/2021 08:31 AM    CO2 25 04/01/2014 01:20 PM    BUN 17 09/13/2021 09:45 AM    BUN 21 01/18/2021 08:31 AM    BUN 13 04/01/2014 01:20 PM    CREATININE 0.8 09/13/2021 09:45 AM    CREATININE 1.0 01/18/2021 08:31 AM    CREATININE 1.1 (H) 04/01/2014 01:20 PM    GLUCOSE 93 09/13/2021 09:45 AM    GLUCOSE 103 (H) 01/18/2021 08:31 AM    GLUCOSE 121 (H) 04/01/2014 01:20 PM    GLUCOSE 90 02/09/2012 05:20 AM    GLUCOSE 108 02/06/2012 05:10 AM    GLUCOSE 91 02/03/2012 05:08 AM    CALCIUM 9.5 09/13/2021 09:45 AM    CALCIUM 10.0 01/18/2021 08:31 AM    CALCIUM 9.0 04/01/2014 01:20 PM       Last 3 CMP:    Lab Results   Component Value Date/Time     09/13/2021 09:45 AM     01/18/2021 08:31 AM     04/01/2014 01:20 PM    K 4.4 09/13/2021 09:45 AM    K 4.8 01/18/2021 08:31 AM    K 3.8 04/01/2014 01:20 PM     09/13/2021 09:45 AM     01/18/2021 08:31 AM     04/01/2014 01:20 PM    CO2 27 09/13/2021 09:45 AM    CO2 23 01/18/2021 08:31 AM    CO2 25 04/01/2014 01:20 PM    BUN 17 09/13/2021 09:45 AM    BUN 21 01/18/2021 08:31 AM    BUN 13 04/01/2014 01:20 PM    CREATININE 0.8 09/13/2021 09:45 AM    CREATININE 1.0 01/18/2021 08:31 AM    CREATININE 1.1 (H) 04/01/2014 01:20 PM    GLUCOSE 93 09/13/2021 09:45 AM    GLUCOSE 103 (H) 01/18/2021 08:31 AM    GLUCOSE 121 (H) 04/01/2014 01:20 PM    GLUCOSE 90 02/09/2012 05:20 AM    GLUCOSE 108 02/06/2012 05:10 AM    GLUCOSE 91 02/03/2012 05:08 AM    CALCIUM 9.5 09/13/2021 09:45 AM    CALCIUM 10.0 01/18/2021 08:31 AM    CALCIUM 9.0 04/01/2014 01:20 PM    PROT 6.5 09/13/2021 09:45 AM    PROT 7.2 01/18/2021 08:31 AM    PROT 5.0 (L) 02/01/2012 04:20 AM    LABALBU 4.0 09/13/2021 09:45 AM    LABALBU 4.3 01/18/2021 08:31 AM    LABALBU 2.9 (L) 02/01/2012 04:20 AM    BILITOT 0.4 09/13/2021 09:45 AM    BILITOT 0.6 01/18/2021 08:31 AM    BILITOT 0.4 02/01/2012 04:20 AM    ALKPHOS 71 09/13/2021 09:45 AM    ALKPHOS 68 01/18/2021 08:31 AM    ALKPHOS 36 (L) 02/01/2012 04:20 AM    AST 19 09/13/2021 09:45 AM    AST 16 01/18/2021 08:31 AM    AST 14 02/01/2012 04:20 AM    ALT 11 09/13/2021 09:45 AM    ALT 12 01/18/2021 08:31 AM    ALT 11 02/01/2012 04:20 AM        CBC:   Lab Results   Component Value Date/Time    WBC 3.3 (L) 09/13/2021 09:45 AM    RBC 4.18 09/13/2021 09:45 AM    HGB 13.3 09/13/2021 09:45 AM    HCT 39.9 09/13/2021 09:45 AM    MCV 95.5 09/13/2021 09:45 AM    MCH 31.8 09/13/2021 09:45 AM    MCHC 33.3 09/13/2021 09:45 AM    RDW 14.0 09/13/2021 09:45 AM     09/13/2021 09:45 AM    MPV 10.7 09/13/2021 09:45 AM       A1C:  Lab Results   Component Value Date/Time    LABA1C 5.4 09/13/2021 09:45 AM       Lipid panel:  Lab Results   Component Value Date    CHOL 181 09/13/2021    CHOL 190 01/18/2021    TRIG 61 09/13/2021    TRIG 101 01/18/2021    HDL 64 09/13/2021    HDL 65 01/18/2021        Lab Results   Component Value Date/Time    PROT 6.5 09/13/2021 09:45 AM    PROT 7.2 01/18/2021 08:31 AM    PROT 5.0 (L) 02/01/2012 04:20 AM       No results found for: MG      Assessment. Cleo Martinez was seen today for cough.     Diagnoses and all orders for this visit:    Moderate asthma with exacerbation, unspecified whether persistent    Left bundle branch block    Acute sinusitis, recurrence not specified, unspecified location    Hypothyroidism, unspecified type    Gastroesophageal reflux disease without esophagitis    History of right breast cancer    Macular degeneration, unspecified laterality, unspecified type    Other orders  - omeprazole (PRILOSEC) 40 MG delayed release capsule; Take 1 capsule by mouth daily  -     levoFLOXacin (LEVAQUIN) 500 MG tablet; Take 1 tablet by mouth daily for 10 days  -     prednisoLONE 5 MG TABS; Take 1 tablet by mouth See Admin Instructions for 12 days 2 tablets twice daily for 3 days, 1 tablet three   Times daily for 3 days, 1 tablet twice daily for 3 days,  1 tablet daily for 3 days       Patient Active Problem List   Diagnosis    DJD (degenerative joint disease) of knee    Asthma    Carotid stenosis, asymptomatic    Spinal stenosis, lumbar region, without neurogenic claudication    Hypothyroidism    Macular degeneration    Gastroesophageal reflux disease without esophagitis    Left bundle branch block    History of right breast cancer    Leukopenia    [facility], unspecified [facility]       Plan: Exacerbation of asthma, Levaquin 500 daily, prednisone taper dose, albuterol nebulizer, continue Singulair    Acute sinusitis Levaquin 500 daily, Flonase no spray, Zyrtec 10 mg daily,    GE reflux disease continue Prilosec    Hypothyroidism continue Synthroid 25 ry    Chronic left bundle branch block she     follows with cardiologist Dr. Vida Hemphill    History of breast cancer she follows with oncologist Dr. Dakota Dupont, it was diagnosed in 2004, she had lumpectomy    History of macular degeneration she follows with ophthalmology     I will be retiring she will see another physician next visit    Return in about 3 months (around 4/3/2022).        Christy Garcia MD  6:43 PM  1/3/2022     [place]

## 2022-02-04 ENCOUNTER — HOSPITAL ENCOUNTER (EMERGENCY)
Age: 87
Discharge: HOME | End: 2022-02-04
Payer: MEDICARE

## 2022-02-04 VITALS
DIASTOLIC BLOOD PRESSURE: 70 MMHG | HEART RATE: 70 BPM | OXYGEN SATURATION: 98 % | RESPIRATION RATE: 20 BRPM | HEIGHT: 61 IN | WEIGHT: 145 LBS | BODY MASS INDEX: 27.38 KG/M2 | TEMPERATURE: 98.2 F | SYSTOLIC BLOOD PRESSURE: 147 MMHG

## 2022-02-04 DIAGNOSIS — B02.9 HERPES ZOSTER WITHOUT COMPLICATION: Primary | ICD-10-CM

## 2022-02-04 PROCEDURE — 99211 OFF/OP EST MAY X REQ PHY/QHP: CPT

## 2022-02-04 RX ORDER — TRAMADOL HYDROCHLORIDE 50 MG/1
50 TABLET ORAL EVERY 6 HOURS PRN
Qty: 12 TABLET | Refills: 0 | Status: SHIPPED | OUTPATIENT
Start: 2022-02-04 | End: 2022-02-07

## 2022-02-04 RX ORDER — VALACYCLOVIR HYDROCHLORIDE 1 G/1
1000 TABLET, FILM COATED ORAL 3 TIMES DAILY
Qty: 21 TABLET | Refills: 0 | Status: SHIPPED | OUTPATIENT
Start: 2022-02-04 | End: 2022-02-11

## 2022-02-04 ASSESSMENT — PAIN DESCRIPTION - PROGRESSION: CLINICAL_PROGRESSION: GRADUALLY WORSENING

## 2022-02-04 ASSESSMENT — PAIN SCALES - GENERAL: PAINLEVEL_OUTOF10: 7

## 2022-02-04 ASSESSMENT — PAIN DESCRIPTION - FREQUENCY: FREQUENCY: CONTINUOUS

## 2022-02-04 ASSESSMENT — PAIN DESCRIPTION - DESCRIPTORS: DESCRIPTORS: THROBBING

## 2022-02-04 ASSESSMENT — PAIN DESCRIPTION - PAIN TYPE: TYPE: ACUTE PAIN

## 2022-02-04 ASSESSMENT — PAIN DESCRIPTION - ORIENTATION: ORIENTATION: RIGHT

## 2022-02-04 ASSESSMENT — PAIN DESCRIPTION - LOCATION: LOCATION: BACK;BREAST

## 2022-02-04 ASSESSMENT — PAIN DESCRIPTION - ONSET: ONSET: GRADUAL

## 2022-02-04 NOTE — ED PROVIDER NOTES
Department of Emergency Medicine  [facility]  Provider Note  Admit Date/Time: 2022  3:09 PM  Room:   NAME: Piyush Ramirez  : 1934  MRN: [id number]     Chief Complaint:  Rash (Has raised reddened rash on right side of back and under right breast. States rash feels painful.)    History of Present Illness        Piyush Ramirez is a 80 y.o. female who has a past medical history of:   Past Medical History:   Diagnosis Date    Arthritis     OSTEOPORASIS    Asthma     STABLE    CAD (coronary artery disease)     30% stenosis per cath 2007    Cancer [facility]) 2004    BREAST RIGHT    Cataracts, both eyes     Eczema     Hypothyroidism     Macular degeneration     Osteoarthritis     Rosacea     Thyroid disease     presents to the urgent care center by private car for evaluation. She has a rash on the right mid back area radiating around to right under the right breast.  That the only place the rash is she said it is a little tingly and painful. It started yesterday. She is denying any other complaint    ROS    Pertinent positives and negatives are stated within HPI, all other systems reviewed and are negative. Past Surgical History:   Procedure Laterality Date    APPENDECTOMY      BACK SURGERY      LUMBAR FUSION    BREAST LUMPECTOMY Right     BREAST SURGERY      LUMPECTOMY RIGHT    CARDIAC CATHETERIZATION  2007    COLONOSCOPY      HYSTERECTOMY      JOINT REPLACEMENT  .    bilateral knee replacements   Social History:  reports that she has never smoked. She has never used smokeless tobacco. She reports that she does not drink alcohol and does not use drugs. Family History: family history is not on file.   Allergies: Penicillins, Chlorpheniramine maleate, Influenza virus vaccine, and Protonix [pantoprazole sodium]    Physical Exam   Oxygen Saturation Interpretation: Normal.   ED Triage Vitals [22 1511]   BP Temp Temp Source Pulse Resp SpO2 Height Weight   (!) 147/70 98.2 °F (36.8 °C) Infrared 70 20 98 % 5' 1\" (1.549 m) 145 lb (65.8 kg)       Physical Exam  · Constitutional/General: Alert and oriented x3, well appearing, non toxic in NAD  · HEENT:  NC/NT. · Neck: Supple, full ROM,  · Respiratory: Lungs clear to auscultation bilaterally, no wheezes, rales, or rhonchi. Not in respiratory distress  · CV:  Regular rate. Regular rhythm. · GI:  Abdomen Soft, Non tender  · Musculoskeletal: Moves all extremities x 4.   · Integument: skin warm and dry. She has clusters of papules on an erythematous base in a dermatomal fashion on the right mid back radiating around to under the right breast.  · Lymphatic: no lymphadenopathy noted  · Neurologic: GCS 15, no focal deficits,   · Psychiatric: Normal Affect    Lab / Imaging Results   (All laboratory and radiology results have been personally reviewed by myself)  Labs:  No results found for this visit on 02/04/22. Imaging: All Radiology results interpreted by Radiologist unless otherwise noted. No orders to display       ED Course / Medical Decision Making   Medications - No data to display       Consult(s):   None      MDM:   Patient with shingles. I did check her last labs and her GFR was over 60 a few months ago I did put her on Valtrex to treat the shingles and also order her some tramadol for pain she should follow-up with her doctor. Assessment      1. Herpes zoster without complication      Plan   Discharge to home and advised to contact call t he referral line to get established with a   [phone]  Schedule an appointment as soon as possible for a visit      Patient condition is good    New Medications     New Prescriptions    TRAMADOL (ULTRAM) 50 MG TABLET    Take 1 tablet by mouth every 6 hours as needed for Pain for up to 3 days.     VALACYCLOVIR (VALTREX) 1 G TABLET    Take 1 tablet by mouth 3 times daily for 7 days     Electronically signed by CHELSEA Arguello - FAZAL   DD: 2/4/22  **This report was transcribed using voice recognition software. Every effort was made to ensure accuracy; however, inadvertent computerized transcription errors may be present.   END OF ED PROVIDER NOTE     CHELSEA Retana - CNP  02/04/22 9559

## 2022-04-22 ENCOUNTER — HOSPITAL ENCOUNTER (EMERGENCY)
Age: 87
Discharge: ANOTHER HOSPITAL | End: 2022-04-22
Payer: MEDICARE

## 2022-04-22 ENCOUNTER — HOSPITAL ENCOUNTER (EMERGENCY)
Age: 87
Discharge: HOME | End: 2022-04-23
Attending: EMERGENCY MEDICINE
Payer: MEDICARE

## 2022-04-22 ENCOUNTER — APPOINTMENT (OUTPATIENT)
Dept: GENERAL RADIOLOGY | Age: 87
End: 2022-04-22
Payer: MEDICARE

## 2022-04-22 VITALS
BODY MASS INDEX: 26.43 KG/M2 | OXYGEN SATURATION: 99 % | TEMPERATURE: 101.5 F | WEIGHT: 140 LBS | HEIGHT: 61 IN | DIASTOLIC BLOOD PRESSURE: 71 MMHG | HEART RATE: 99 BPM | RESPIRATION RATE: 20 BRPM | SYSTOLIC BLOOD PRESSURE: 116 MMHG

## 2022-04-22 DIAGNOSIS — R06.02 SHORTNESS OF BREATH: ICD-10-CM

## 2022-04-22 DIAGNOSIS — R50.9 FEVER, UNSPECIFIED FEVER CAUSE: Primary | ICD-10-CM

## 2022-04-22 DIAGNOSIS — J06.9 ACUTE UPPER RESPIRATORY INFECTION: Primary | ICD-10-CM

## 2022-04-22 LAB
ALBUMIN SERPL-MCNC: 3.9 G/DL (ref 3.5–5.2)
ALP BLD-CCNC: 68 U/L (ref 35–104)
ALT SERPL-CCNC: 11 U/L (ref 0–32)
ANION GAP SERPL CALCULATED.3IONS-SCNC: 14 MMOL/L (ref 7–16)
AST SERPL-CCNC: 15 U/L (ref 0–31)
BACTERIA: ABNORMAL /HPF
BASOPHILS ABSOLUTE: 0.07 E9/L (ref 0–0.2)
BASOPHILS RELATIVE PERCENT: 1 % (ref 0–2)
BILIRUB SERPL-MCNC: 0.4 MG/DL (ref 0–1.2)
BILIRUBIN URINE: NEGATIVE
BLOOD, URINE: ABNORMAL
BUN BLDV-MCNC: 21 MG/DL (ref 6–23)
CALCIUM SERPL-MCNC: 8.7 MG/DL (ref 8.6–10.2)
CHLORIDE BLD-SCNC: 98 MMOL/L (ref 98–107)
CLARITY: ABNORMAL
CO2: 19 MMOL/L (ref 22–29)
COLOR: YELLOW
CREAT SERPL-MCNC: 1.1 MG/DL (ref 0.5–1)
EOSINOPHILS ABSOLUTE: 0 E9/L (ref 0.05–0.5)
EOSINOPHILS RELATIVE PERCENT: 0 % (ref 0–6)
EPITHELIAL CELLS, UA: ABNORMAL /HPF
GFR AFRICAN AMERICAN: 57
GFR NON-AFRICAN AMERICAN: 47 ML/MIN/1.73
GLUCOSE BLD-MCNC: 119 MG/DL (ref 74–99)
GLUCOSE URINE: NEGATIVE MG/DL
HCT VFR BLD CALC: 37.3 % (ref 34–48)
HEMOGLOBIN: 12.6 G/DL (ref 11.5–15.5)
INFLUENZA A BY PCR: NOT DETECTED
INFLUENZA A BY PCR: NOT DETECTED
INFLUENZA B BY PCR: NOT DETECTED
INFLUENZA B BY PCR: NOT DETECTED
KETONES, URINE: 15 MG/DL
LACTIC ACID: 1.1 MMOL/L (ref 0.5–2.2)
LEUKOCYTE ESTERASE, URINE: ABNORMAL
LIPASE: 26 U/L (ref 13–60)
LYMPHOCYTES ABSOLUTE: 0.13 E9/L (ref 1.5–4)
LYMPHOCYTES RELATIVE PERCENT: 2 % (ref 20–42)
MCH RBC QN AUTO: 32.7 PG (ref 26–35)
MCHC RBC AUTO-ENTMCNC: 33.8 % (ref 32–34.5)
MCV RBC AUTO: 96.9 FL (ref 80–99.9)
MONOCYTES ABSOLUTE: 0.53 E9/L (ref 0.1–0.95)
MONOCYTES RELATIVE PERCENT: 8 % (ref 2–12)
NEUTROPHILS ABSOLUTE: 5.87 E9/L (ref 1.8–7.3)
NEUTROPHILS RELATIVE PERCENT: 89 % (ref 43–80)
NITRITE, URINE: NEGATIVE
PDW BLD-RTO: 13.1 FL (ref 11.5–15)
PH UA: 6.5 (ref 5–9)
PLATELET # BLD: 151 E9/L (ref 130–450)
PMV BLD AUTO: 10.6 FL (ref 7–12)
POTASSIUM REFLEX MAGNESIUM: 4 MMOL/L (ref 3.5–5)
PRO-BNP: 389 PG/ML (ref 0–450)
PROTEIN UA: NEGATIVE MG/DL
RBC # BLD: 3.85 E12/L (ref 3.5–5.5)
RBC # BLD: NORMAL 10*6/UL
RBC UA: ABNORMAL /HPF (ref 0–2)
SARS-COV-2, NAAT: NOT DETECTED
SODIUM BLD-SCNC: 131 MMOL/L (ref 132–146)
SPECIFIC GRAVITY UA: 1.02 (ref 1–1.03)
TOTAL PROTEIN: 6.4 G/DL (ref 6.4–8.3)
TROPONIN, HIGH SENSITIVITY: 15 NG/L (ref 0–9)
UROBILINOGEN, URINE: 0.2 E.U./DL
WBC # BLD: 6.6 E9/L (ref 4.5–11.5)
WBC UA: ABNORMAL /HPF (ref 0–5)

## 2022-04-22 PROCEDURE — 84484 ASSAY OF TROPONIN QUANT: CPT

## 2022-04-22 PROCEDURE — 99285 EMERGENCY DEPT VISIT HI MDM: CPT

## 2022-04-22 PROCEDURE — 87502 INFLUENZA DNA AMP PROBE: CPT

## 2022-04-22 PROCEDURE — 85025 COMPLETE CBC W/AUTO DIFF WBC: CPT

## 2022-04-22 PROCEDURE — 83605 ASSAY OF LACTIC ACID: CPT

## 2022-04-22 PROCEDURE — 2580000003 HC RX 258: Performed by: STUDENT IN AN ORGANIZED HEALTH CARE EDUCATION/TRAINING PROGRAM

## 2022-04-22 PROCEDURE — 81001 URINALYSIS AUTO W/SCOPE: CPT

## 2022-04-22 PROCEDURE — 83690 ASSAY OF LIPASE: CPT

## 2022-04-22 PROCEDURE — 6360000002 HC RX W HCPCS: Performed by: STUDENT IN AN ORGANIZED HEALTH CARE EDUCATION/TRAINING PROGRAM

## 2022-04-22 PROCEDURE — 93005 ELECTROCARDIOGRAM TRACING: CPT | Performed by: PHYSICIAN ASSISTANT

## 2022-04-22 PROCEDURE — 80053 COMPREHEN METABOLIC PANEL: CPT

## 2022-04-22 PROCEDURE — 99211 OFF/OP EST MAY X REQ PHY/QHP: CPT

## 2022-04-22 PROCEDURE — 87635 SARS-COV-2 COVID-19 AMP PRB: CPT

## 2022-04-22 PROCEDURE — 71046 X-RAY EXAM CHEST 2 VIEWS: CPT

## 2022-04-22 PROCEDURE — 83880 ASSAY OF NATRIURETIC PEPTIDE: CPT

## 2022-04-22 PROCEDURE — 87040 BLOOD CULTURE FOR BACTERIA: CPT

## 2022-04-22 PROCEDURE — 6370000000 HC RX 637 (ALT 250 FOR IP): Performed by: PHYSICIAN ASSISTANT

## 2022-04-22 PROCEDURE — 87088 URINE BACTERIA CULTURE: CPT

## 2022-04-22 PROCEDURE — 96374 THER/PROPH/DIAG INJ IV PUSH: CPT

## 2022-04-22 RX ORDER — ONDANSETRON 2 MG/ML
4 INJECTION INTRAMUSCULAR; INTRAVENOUS ONCE
Status: COMPLETED | OUTPATIENT
Start: 2022-04-22 | End: 2022-04-22

## 2022-04-22 RX ORDER — ONDANSETRON 4 MG/1
4 TABLET, ORALLY DISINTEGRATING ORAL ONCE
Status: COMPLETED | OUTPATIENT
Start: 2022-04-22 | End: 2022-04-22

## 2022-04-22 RX ORDER — ACETAMINOPHEN 325 MG/1
650 TABLET ORAL ONCE
Status: COMPLETED | OUTPATIENT
Start: 2022-04-22 | End: 2022-04-22

## 2022-04-22 RX ORDER — 0.9 % SODIUM CHLORIDE 0.9 %
1000 INTRAVENOUS SOLUTION INTRAVENOUS ONCE
Status: COMPLETED | OUTPATIENT
Start: 2022-04-22 | End: 2022-04-23

## 2022-04-22 RX ADMIN — ACETAMINOPHEN 650 MG: 325 TABLET ORAL at 20:09

## 2022-04-22 RX ADMIN — ONDANSETRON 4 MG: 4 TABLET, ORALLY DISINTEGRATING ORAL at 20:11

## 2022-04-22 RX ADMIN — ONDANSETRON 4 MG: 2 INJECTION INTRAMUSCULAR; INTRAVENOUS at 23:20

## 2022-04-22 RX ADMIN — SODIUM CHLORIDE 1000 ML: 9 INJECTION, SOLUTION INTRAVENOUS at 23:20

## 2022-04-22 ASSESSMENT — PAIN DESCRIPTION - PAIN TYPE: TYPE: ACUTE PAIN

## 2022-04-22 ASSESSMENT — PAIN DESCRIPTION - FREQUENCY: FREQUENCY: CONTINUOUS

## 2022-04-22 ASSESSMENT — PAIN DESCRIPTION - DESCRIPTORS: DESCRIPTORS: ACHING

## 2022-04-22 ASSESSMENT — PAIN DESCRIPTION - LOCATION: LOCATION: GENERALIZED

## 2022-04-22 ASSESSMENT — PAIN - FUNCTIONAL ASSESSMENT: PAIN_FUNCTIONAL_ASSESSMENT: 0-10

## 2022-04-22 ASSESSMENT — PAIN SCALES - GENERAL: PAINLEVEL_OUTOF10: 8

## 2022-04-22 ASSESSMENT — PAIN DESCRIPTION - ONSET: ONSET: GRADUAL

## 2022-04-23 VITALS
DIASTOLIC BLOOD PRESSURE: 57 MMHG | RESPIRATION RATE: 16 BRPM | TEMPERATURE: 98 F | HEART RATE: 75 BPM | OXYGEN SATURATION: 95 % | SYSTOLIC BLOOD PRESSURE: 119 MMHG

## 2022-04-23 LAB
BACTERIA: ABNORMAL /HPF
BILIRUBIN URINE: NEGATIVE
BLOOD, URINE: NEGATIVE
CLARITY: CLEAR
COLOR: YELLOW
EKG ATRIAL RATE: 77 BPM
EKG P AXIS: 70 DEGREES
EKG P-R INTERVAL: 164 MS
EKG Q-T INTERVAL: 438 MS
EKG QRS DURATION: 134 MS
EKG QTC CALCULATION (BAZETT): 495 MS
EKG R AXIS: -48 DEGREES
EKG T AXIS: 92 DEGREES
EKG VENTRICULAR RATE: 77 BPM
EPITHELIAL CELLS, UA: ABNORMAL /HPF
GLUCOSE URINE: NEGATIVE MG/DL
KETONES, URINE: NEGATIVE MG/DL
LEUKOCYTE ESTERASE, URINE: ABNORMAL
NITRITE, URINE: NEGATIVE
PH UA: 6 (ref 5–9)
PROTEIN UA: NEGATIVE MG/DL
RBC UA: ABNORMAL /HPF (ref 0–2)
SPECIFIC GRAVITY UA: 1.01 (ref 1–1.03)
TROPONIN, HIGH SENSITIVITY: 15 NG/L (ref 0–9)
UROBILINOGEN, URINE: 0.2 E.U./DL
WBC UA: ABNORMAL /HPF (ref 0–5)

## 2022-04-23 RX ORDER — ONDANSETRON 4 MG/1
4 TABLET, ORALLY DISINTEGRATING ORAL 3 TIMES DAILY PRN
Qty: 21 TABLET | Refills: 0 | Status: SHIPPED | OUTPATIENT
Start: 2022-04-23

## 2022-04-23 NOTE — ED NOTES
Department of Emergency Medicine  FIRST PROVIDER TRIAGE NOTE             Independent MLP           4/22/22  9:10 PM EDT    Date of Encounter: 4/22/22   MRN: [id number]      HPI: Marti Lal is a 80 y.o. female who presents to the ED for Shortness of Breath (sent from urgent care, coughing started yesterday, short of breath today, vomiting today, fever 101.5 today, chills)   sob ,  Fever vomiting sent from urgent care     ROS: Negative for abd pain or rash. PE: Gen Appearance/Constitutional: alert  CV: regular rate  Pulm: CTA bilat     Initial Plan of Care: All treatment areas with department are currently occupied. Plan to order/Initiate the following while awaiting opening in ED: labs, EKG and imaging studies.   Initiate Treatment-Testing, Proceed toTreatment Area When Bed Available for ED Attending/MLP to Continue Care    Electronically signed by GERMANIA Membreno   DD: 4/22/22       GERMANIA Membreno  04/22/22 1234      ATTENDING PROVIDER ATTESTATION:     Supervising Physician, on-site, available for consultation, non-participatory in the evaluation or care of this patient       [address] [place] [address],   05/25/22 8024

## 2022-04-23 NOTE — ED PROVIDER NOTES
HPI:  4/22/22, Time: 8:15 PM EDT         Jesus Cartagena is a 80 y.o. female presenting to the ED for vomiting, fever, chills and body aches, beginning 1 day ago however worsened this evening prompting evaluation. The complaint has been persistent, moderate in severity, and worsened by eating. Patient reports she started with a cough and congestion several days ago after being around her grandchildren at [facility]. She states that today however she noticed chills and body aches as well as a fever 101 °F.  She called her PCP who prescribed Bactrim. She did take a dose of this however started vomiting several times this evening. Patient's daughter is with her helping provide history. Daughter reports that when the patient called her to come for evaluation she was breathing heavily at that time. Patient currently denies any chest pain or shortness of breath. Denies recent travel. She has not taken Tylenol or Motrin today for her fever. Patient denies all other symptoms at this time. Review of Systems:   A complete review of systems was performed and pertinent positives and negatives are stated within HPI, all other systems reviewed and are negative.          --------------------------------------------- PAST HISTORY ---------------------------------------------  Past Medical History:  has a past medical history of Arthritis, Asthma, CAD (coronary artery disease), [facility] Cataracts, both eyes, Eczema, Hypothyroidism, Macular degeneration, Osteoarthritis, Rosacea, and Thyroid disease. Past Surgical History:  has a past surgical history that includes Hysterectomy; Breast surgery; Colonoscopy; Appendectomy; back surgery; joint replacement (Jan. 30,2012.); Cardiac catheterization (03/2007); and Breast lumpectomy (Right). Social History:  reports that she has never smoked. She has never used smokeless tobacco. She reports that she does not drink alcohol and does not use drugs.     Family History: family history is not on file. The patients home medications have been reviewed. Allergies: Penicillins, Chlorpheniramine maleate, Influenza virus vaccine, and Protonix [pantoprazole sodium]    -------------------------------------------------- RESULTS -------------------------------------------------  All laboratory and radiology results have been personally reviewed by myself   LABS:  Results for orders placed or performed during the hospital encounter of 04/22/22   Rapid influenza A/B antigens    Specimen: Nasopharyngeal   Result Value Ref Range    Influenza A by PCR Not Detected Not Detected    Influenza B by PCR Not Detected Not Detected   Urinalysis   Result Value Ref Range    Color, UA Yellow Straw/Yellow    Clarity, UA SL CLOUDY Clear    Glucose, Ur Negative Negative mg/dL    Bilirubin Urine Negative Negative    Ketones, Urine 15 (A) Negative mg/dL    Specific Gravity, UA 1.020 1.005 - 1.030    Blood, Urine TRACE (A) Negative    pH, UA 6.5 5.0 - 9.0    Protein, UA Negative Negative mg/dL    Urobilinogen, Urine 0.2 <2.0 E.U./dL    Nitrite, Urine Negative Negative    Leukocyte Esterase, Urine TRACE (A) Negative   Microscopic Urinalysis   Result Value Ref Range    WBC, UA 0-1 0 - 5 /HPF    RBC, UA 0-1 0 - 2 /HPF    Epithelial Cells, UA RARE /HPF    Bacteria, UA RARE (A) None Seen /HPF       RADIOLOGY:  Interpreted by Radiologist.  No orders to display       ------------------------- NURSING NOTES AND VITALS REVIEWED ---------------------------   The nursing notes within the ED encounter and vital signs as below have been reviewed.    /71   Pulse 99   Temp 101.5 °F (38.6 °C) (Infrared)   Resp 20   Ht 5' 1\" (1.549 m)   Wt 140 lb (63.5 kg)   SpO2 99%   BMI 26.45 kg/m²   Oxygen Saturation Interpretation: Normal      ---------------------------------------------------PHYSICAL EXAM--------------------------------------      Constitutional/General: Alert and oriented x3, well appearing, non toxic in NAD  Head: Normocephalic and atraumatic  Eyes: PERRL, EOMI  Mouth: Oropharynx clear, handling secretions, no trismus  Neck: Supple, full ROM,   Pulmonary: Lungs clear to auscultation bilaterally, no wheezes, rales, or rhonchi. Not in respiratory distress  Cardiovascular:  Regular rate and rhythm, no murmurs, gallops, or rubs. 2+ distal pulses  Abdomen: Soft, non tender, non distended,   Extremities: Moves all extremities x 4. Warm and well perfused  Skin: warm and dry without rash  Neurologic: GCS 15,  Psych: Normal Affect      ------------------------------ ED COURSE/MEDICAL DECISION MAKING----------------------  Medications   ondansetron (ZOFRAN-ODT) disintegrating tablet 4 mg (4 mg Oral Given 4/22/22 2011)   acetaminophen (TYLENOL) tablet 650 mg (650 mg Oral Given 4/22/22 2009)         ED COURSE:  ED Course as of 04/22/22 2030 Fri Apr 22, 2022 2029 Reassessed patient. Remained stable and neurovascularly intact. Discussed influenza and urine results with patient and her daughter. Patient's daughter reports that the patient has been breathing heavily while in the room, more so than she was earlier today. Patient does admit to shortness of breath at this time but denies any chest pain. At this time we do not have a good source for her fever or reason for her shortness of breath therefore recommended ED evaluation for lab work and imaging studies. Daughter does feel comfortable driving the patient to [facility] for emergency department evaluation. They will go immediately there for further evaluation and treatment. [MS]      ED Course User Index  [MS] Du Acostama       Medical Decision Making:    See ED course above. Counseling: The emergency provider has spoken with the patient and family member daughter and discussed todays results, in addition to providing specific details for the plan of care and counseling regarding the diagnosis and prognosis.   Questions are answered at this time and they are agreeable with the plan.      --------------------------------- IMPRESSION AND DISPOSITION ---------------------------------    IMPRESSION  1. Fever, unspecified fever cause    2. Shortness of breath        DISPOSITION  Disposition: Discharge to home  Patient condition is stable      NOTE: This report was transcribed using voice recognition software.  Every effort was made to ensure accuracy; however, inadvertent computerized transcription errors may be present        Madalyn Lund [place]  04/22/22 2030

## 2022-04-23 NOTE — ED PROVIDER NOTES
51-year-old female who presents today to the emergency department with complaints of cough, congestion, fevers, chills, nausea, vomiting, fevers, chills, mild shortness of breath onset today. Patient was seen earlier at urgent care and sent to the emergency department for evaluation. Patient describes her symptoms as moderate in severity, persistent in nature and progressively worsening over time. Nothing seems to make them better or worse. She does not have any sick contacts at home. She denies any chest pain. She does not feel dizzy or lightheaded. Review of Systems   Constitutional: Positive for chills and fever. Negative for diaphoresis. HENT: Positive for congestion. Negative for ear pain, hearing loss, rhinorrhea, sinus pain, sore throat and trouble swallowing. Eyes: Negative for photophobia and pain. Respiratory: Positive for cough and shortness of breath. Negative for wheezing. Cardiovascular: Negative for chest pain and palpitations. Gastrointestinal: Positive for nausea and vomiting. Negative for abdominal pain and diarrhea. Endocrine: Negative for polyuria. Genitourinary: Negative for flank pain, frequency, hematuria and urgency. Musculoskeletal: Negative for back pain, neck pain and neck stiffness. Neurological: Negative for dizziness, speech difficulty, weakness, light-headedness and numbness. Psychiatric/Behavioral: Negative for confusion. The patient is not nervous/anxious. Physical Exam  Constitutional:       General: She is not in acute distress. Appearance: Normal appearance. She is not ill-appearing, toxic-appearing or diaphoretic. HENT:      Head: Normocephalic and atraumatic. Nose: No rhinorrhea. Eyes:      General: No scleral icterus. Extraocular Movements: Extraocular movements intact. Pupils: Pupils are equal, round, and reactive to light. Cardiovascular:      Heart sounds: Normal heart sounds. No murmur heard.   No friction rub. No gallop. Pulmonary:      Breath sounds: Normal breath sounds. No wheezing, rhonchi or rales. Abdominal:      Palpations: Abdomen is soft. Tenderness: There is no abdominal tenderness. Musculoskeletal:         General: No swelling. Cervical back: Normal range of motion. No rigidity or tenderness. Right lower leg: No edema. Left lower leg: No edema. Lymphadenopathy:      Cervical: No cervical adenopathy. Skin:     General: Skin is warm and dry. Coloration: Skin is not jaundiced. Neurological:      General: No focal deficit present. Mental Status: She is alert and oriented to person, place, and time. Cranial Nerves: No cranial nerve deficit. Sensory: No sensory deficit. Motor: No weakness. Psychiatric:         Mood and Affect: Mood normal.         Behavior: Behavior normal.         Thought Content: Thought content normal.         Judgment: Judgment normal.          Procedures     EKG: This EKG is signed and interpreted by me. Rate: 77  Rhythm: Sinus  Interpretation: Normal sinus rhythm. No ST elevations. No ST depressions. Normal intervals. Comparison: stable as compared to patient's most recent EKG      MDM  Number of Diagnoses or Management Options  Acute upper respiratory infection  Diagnosis management comments: This an 59-year-old female who presents today to the emergency department with several URI symptoms onset today. Patient was seen earlier at urgent care and directed to the emergency department for evaluation. On arrival to the ED patient is nontoxic, no acute distress. Vital signs reviewed and showed that she is Nonfebrile. On exam her lungs are clear to auscultation. Otherwise normal physical exam.  Appropriate labs and imaging were ordered. Patient was started on IV fluids as well as Zofran for nausea control. Work-up reviewed and showed an unremarkable CBC, CMP, urinalysis showed no signs of infection.   EKG reviewed and showed normal sinus rhythm. High-sensitivity troponin x2 with a delta of 0. Discussed with patient that her symptoms may likely be secondary to a viral URI. She was encouraged to continue supportive care at home. Follow-up was discussed with PCP. Return precautions were given. Patient verbalized understanding. ED Course as of 04/24/22 1541   Sat Apr 23, 2022 0007 Cough, congestion, fevers, chills, nausea and vomiting onset today. [NS]      ED Course User Index  [NS] Sarah Cordero DO       ED Course as of 04/24/22 1541   Sat Apr 23, 2022 0007 Cough, congestion, fevers, chills, nausea and vomiting onset today. [NS]      ED Course User Index  [NS] Sarah Cordero, DO       --------------------------------------------- PAST HISTORY ---------------------------------------------  Past Medical History:  has a past medical history of Arthritis, Asthma, CAD (coronary artery disease), [facility] Cataracts, both eyes, Eczema, Hypothyroidism, Macular degeneration, Osteoarthritis, Rosacea, and Thyroid disease. Past Surgical History:  has a past surgical history that includes Hysterectomy; Breast surgery; Colonoscopy; Appendectomy; back surgery; joint replacement (Jan. 30,2012.); Cardiac catheterization (03/2007); and Breast lumpectomy (Right). Social History:  reports that she has never smoked. She has never used smokeless tobacco. She reports that she does not drink alcohol and does not use drugs. Family History: family history is not on file. The patients home medications have been reviewed.     Allergies: Penicillins, Chlorpheniramine maleate, Influenza virus vaccine, and Protonix [pantoprazole sodium]    -------------------------------------------------- RESULTS -------------------------------------------------  Labs:  Results for orders placed or performed during the hospital encounter of 04/22/22   Culture, Blood 1    Specimen: Blood   Result Value Ref Range    Blood Culture, Routine 24 Hours no growth    Culture, Blood 2    Specimen: Blood   Result Value Ref Range    Culture, Blood 2 24 Hours no growth    COVID-19, Rapid    Specimen: Nasopharyngeal Swab   Result Value Ref Range    SARS-CoV-2, NAAT Not Detected Not Detected   RAPID INFLUENZA A/B ANTIGENS    Specimen: Nasopharyngeal   Result Value Ref Range    Influenza A by PCR Not Detected Not Detected    Influenza B by PCR Not Detected Not Detected   Culture, Urine    Specimen: Urine, clean catch   Result Value Ref Range    Urine Culture, Routine Growth not present, incubation continues    CBC with Auto Differential   Result Value Ref Range    WBC 6.6 4.5 - 11.5 E9/L    RBC 3.85 3.50 - 5.50 E12/L    Hemoglobin 12.6 11.5 - 15.5 g/dL    Hematocrit 37.3 34.0 - 48.0 %    MCV 96.9 80.0 - 99.9 fL    MCH 32.7 26.0 - 35.0 pg    MCHC 33.8 32.0 - 34.5 %    RDW 13.1 11.5 - 15.0 fL    Platelets 695 291 - 194 E9/L    MPV 10.6 7.0 - 12.0 fL    Neutrophils % 89.0 (H) 43.0 - 80.0 %    Lymphocytes % 2.0 (L) 20.0 - 42.0 %    Monocytes % 8.0 2.0 - 12.0 %    Eosinophils % 0.0 0.0 - 6.0 %    Basophils % 1.0 0.0 - 2.0 %    Neutrophils Absolute 5.87 1.80 - 7.30 E9/L    Lymphocytes Absolute 0.13 (L) 1.50 - 4.00 E9/L    Monocytes Absolute 0.53 0.10 - 0.95 E9/L    Eosinophils Absolute 0.00 (L) 0.05 - 0.50 E9/L    Basophils Absolute 0.07 0.00 - 0.20 E9/L    RBC Morphology Normal    Comprehensive Metabolic Panel w/ Reflex to MG   Result Value Ref Range    Sodium 131 (L) 132 - 146 mmol/L    Potassium reflex Magnesium 4.0 3.5 - 5.0 mmol/L    Chloride 98 98 - 107 mmol/L    CO2 19 (L) 22 - 29 mmol/L    Anion Gap 14 7 - 16 mmol/L    Glucose 119 (H) 74 - 99 mg/dL    BUN 21 6 - 23 mg/dL    CREATININE 1.1 (H) 0.5 - 1.0 mg/dL    GFR Non-African American 47 >=60 mL/min/1.73    GFR African American 57     Calcium 8.7 8.6 - 10.2 mg/dL    Total Protein 6.4 6.4 - 8.3 g/dL    Albumin 3.9 3.5 - 5.2 g/dL    Total Bilirubin 0.4 0.0 - 1.2 mg/dL    Alkaline Phosphatase 68 35 - 104 U/L    ALT 11 0 - 32 U/L    AST 15 0 - 31 U/L   Troponin   Result Value Ref Range    Troponin, High Sensitivity 15 (H) 0 - 9 ng/L   Brain Natriuretic Peptide   Result Value Ref Range    Pro- 0 - 450 pg/mL   Lactic Acid   Result Value Ref Range    Lactic Acid 1.1 0.5 - 2.2 mmol/L   Lipase   Result Value Ref Range    Lipase 26 13 - 60 U/L   Troponin   Result Value Ref Range    Troponin, High Sensitivity 15 (H) 0 - 9 ng/L   Urinalysis with Microscopic   Result Value Ref Range    Color, UA Yellow Straw/Yellow    Clarity, UA Clear Clear    Glucose, Ur Negative Negative mg/dL    Bilirubin Urine Negative Negative    Ketones, Urine Negative Negative mg/dL    Specific Gravity, UA 1.015 1.005 - 1.030    Blood, Urine Negative Negative    pH, UA 6.0 5.0 - 9.0    Protein, UA Negative Negative mg/dL    Urobilinogen, Urine 0.2 <2.0 E.U./dL    Nitrite, Urine Negative Negative    Leukocyte Esterase, Urine TRACE (A) Negative    WBC, UA 1-3 0 - 5 /HPF    RBC, UA NONE 0 - 2 /HPF    Epithelial Cells, UA FEW /HPF    Bacteria, UA FEW (A) None Seen /HPF   EKG 12 Lead   Result Value Ref Range    Ventricular Rate 77 BPM    Atrial Rate 77 BPM    P-R Interval 164 ms    QRS Duration 134 ms    Q-T Interval 438 ms    QTc Calculation (Bazett) 495 ms    P Axis 70 degrees    R Axis -48 degrees    T Axis 92 degrees       Radiology:  XR CHEST (2 VW)   Final Result   No acute process. ------------------------- NURSING NOTES AND VITALS REVIEWED ---------------------------  Date / Time Roomed:  4/22/2022  9:13 PM  ED Bed Assignment:  08/08    The nursing notes within the ED encounter and vital signs as below have been reviewed.    BP (!) 119/57   Pulse 75   Temp 98 °F (36.7 °C) (Temporal)   Resp 16   SpO2 95%   Oxygen Saturation Interpretation: Normal      ------------------------------------------ PROGRESS NOTES ------------------------------------------  3:41 PM EDT  I have spoken with the patient and discussed todays results, in addition to providing specific details for the plan of care and counseling regarding the diagnosis and prognosis. Their questions are answered at this time and they are agreeable with the plan. I discussed at length with them reasons for immediate return here for re evaluation. They will followup with their primary care physician by calling their office tomorrow. --------------------------------- ADDITIONAL PROVIDER NOTES ---------------------------------  At this time the patient is without objective evidence of an acute process requiring hospitalization or inpatient management. They have remained hemodynamically stable throughout their entire ED visit and are stable for discharge with outpatient follow-up. The plan has been discussed in detail and they are aware of the specific conditions for emergent return, as well as the importance of follow-up. Discharge Medication List as of 4/23/2022 12:30 AM      START taking these medications    Details   ondansetron (ZOFRAN-ODT) 4 MG disintegrating tablet Take 1 tablet by mouth 3 times daily as needed for Nausea or Vomiting, Disp-21 tablet, R-0Print             Diagnosis:  1. Acute upper respiratory infection        Disposition:  Patient's disposition: Discharge to home  Patient's condition is stable. Jean Og DO  Resident  04/24/22 6672    ATTENDING PROVIDER ATTESTATION:     Suhail Medina presented to the emergency department for evaluation of Shortness of Breath (sent from urgent care, coughing started yesterday, short of breath today, vomiting today, fever 101.5 today, chills)   and was initially evaluated by the Medical Resident. See Original ED Note for H&P and ED course above. I have reviewed and discussed the case, including pertinent history (medical, surgical, family and social) and exam findings with the Medical Resident assigned to Suhail Medina.   I have personally performed and/or participated in the history, exam, medical decision making, and procedures and agree with all pertinent clinical information. I have reviewed my findings and recommendations with the assigned Medical Resident, Gigi Zaldivar and members of family present at the time of disposition. My findings/plan: The encounter diagnosis was Acute upper respiratory infection.   Discharge Medication List as of 4/23/2022 12:30 AM      START taking these medications    Details   ondansetron (ZOFRAN-ODT) 4 MG disintegrating tablet Take 1 tablet by mouth 3 times daily as needed for Nausea or Vomiting, Disp-21 tablet, R-0Print           [address], [place]  05/25/22 5135

## 2022-04-24 ASSESSMENT — ENCOUNTER SYMPTOMS
TROUBLE SWALLOWING: 0
COUGH: 1
EYE PAIN: 0
WHEEZING: 0
SHORTNESS OF BREATH: 1
RHINORRHEA: 0
SINUS PAIN: 0
PHOTOPHOBIA: 0
NAUSEA: 1
VOMITING: 1
SORE THROAT: 0
ABDOMINAL PAIN: 0
BACK PAIN: 0
DIARRHEA: 0

## 2022-04-25 LAB — URINE CULTURE, ROUTINE: NORMAL

## 2022-04-28 LAB
BLOOD CULTURE, ROUTINE: NORMAL
CULTURE, BLOOD 2: NORMAL

## 2022-10-14 ENCOUNTER — IMMUNIZATION (OUTPATIENT)
Dept: PRIMARY CARE CLINIC | Age: 87
End: 2022-10-14
Payer: MEDICARE

## 2022-10-14 PROCEDURE — 0134A COVID-19, MODERNA BIVALENT BOOSTER, (AGE 18Y+), IM, 50 MCG/0.5 ML: CPT | Performed by: SURGERY

## 2022-10-14 PROCEDURE — 91313 COVID-19, MODERNA BIVALENT BOOSTER, (AGE 18Y+), IM, 50 MCG/0.5 ML: CPT | Performed by: SURGERY

## 2024-11-16 ENCOUNTER — HOSPITAL ENCOUNTER (EMERGENCY)
Age: 89
Discharge: HOME | End: 2024-11-16
Payer: MEDICARE

## 2024-11-16 ENCOUNTER — APPOINTMENT (OUTPATIENT)
Dept: GENERAL RADIOLOGY | Age: 89
End: 2024-11-16
Payer: MEDICARE

## 2024-11-16 VITALS
DIASTOLIC BLOOD PRESSURE: 65 MMHG | BODY MASS INDEX: 27.02 KG/M2 | SYSTOLIC BLOOD PRESSURE: 143 MMHG | OXYGEN SATURATION: 100 % | HEART RATE: 70 BPM | TEMPERATURE: 96.6 F | WEIGHT: 143 LBS | RESPIRATION RATE: 18 BRPM

## 2024-11-16 DIAGNOSIS — L03.115 CELLULITIS OF RIGHT ANTERIOR LOWER LEG: Primary | ICD-10-CM

## 2024-11-16 PROCEDURE — 6360000002 HC RX W HCPCS: Performed by: NURSE PRACTITIONER

## 2024-11-16 PROCEDURE — 73590 X-RAY EXAM OF LOWER LEG: CPT

## 2024-11-16 PROCEDURE — 99211 OFF/OP EST MAY X REQ PHY/QHP: CPT

## 2024-11-16 PROCEDURE — 90471 IMMUNIZATION ADMIN: CPT | Performed by: NURSE PRACTITIONER

## 2024-11-16 PROCEDURE — 90715 TDAP VACCINE 7 YRS/> IM: CPT | Performed by: NURSE PRACTITIONER

## 2024-11-16 RX ORDER — BACITRACIN ZINC 500 [USP'U]/G
OINTMENT TOPICAL ONCE
Status: DISCONTINUED | OUTPATIENT
Start: 2024-11-16 | End: 2024-11-16

## 2024-11-16 RX ORDER — DOXYCYCLINE HYCLATE 100 MG
100 TABLET ORAL 2 TIMES DAILY
Qty: 14 TABLET | Refills: 0 | Status: SHIPPED | OUTPATIENT
Start: 2024-11-16 | End: 2024-11-23

## 2024-11-16 RX ORDER — VALSARTAN 160 MG/1
160 TABLET ORAL DAILY
COMMUNITY

## 2024-11-16 RX ORDER — MUPIROCIN 20 MG/G
OINTMENT TOPICAL
Qty: 15 G | Refills: 0 | Status: SHIPPED | OUTPATIENT
Start: 2024-11-16 | End: 2024-11-23

## 2024-11-16 RX ADMIN — TETANUS TOXOID, REDUCED DIPHTHERIA TOXOID AND ACELLULAR PERTUSSIS VACCINE, ADSORBED 0.5 ML: 5; 2.5; 8; 8; 2.5 SUSPENSION INTRAMUSCULAR at 08:41

## 2024-11-16 ASSESSMENT — PAIN SCALES - GENERAL: PAINLEVEL_OUTOF10: 5

## 2024-11-16 ASSESSMENT — PAIN - FUNCTIONAL ASSESSMENT: PAIN_FUNCTIONAL_ASSESSMENT: 0-10

## 2024-11-16 NOTE — DISCHARGE INSTRUCTIONS
Clean right lower leg daily with soap and water and reapply antibiotic ointment.  Keep covered with a nonadhering dressing.

## 2024-11-16 NOTE — ED PROVIDER NOTES
Independent KATHIE Visit.        [facility]  ED  Encounter Note  Admit Date/RoomTime: 2024  8:16 AM  ED Room:   NAME: Lillian Aj  : 3/26/1934  MRN: [id number]  PCP: Jarred Bridges MD    CHIEF COMPLAINT     Fall (Fell 1 weeks ago injuring her right shin. Swelling and redness. )    HISTORY OF PRESENT ILLNESS        Lillian Aj is a 89 y.o. female who presents to the ED with complaints of redness, swelling, and a skin abrasion to the mid right shin.  Patient states she fell 10 days ago striking her right tib-fib on the edge of a wastebasket.  Patient denies any other injuries.  Patient states she has been able to ambulate on the right foot and right ankle since the incident.  Patient states swelling of the right shin has gone down, but still remains with redness and tenderness.  Patient states she does not know when her last tetanus shot was.  Patient denies striking her head or use of blood thinners.  Patient states she does take aspirin daily.    REVIEW OF SYSTEMS     Pertinent positives and negatives are stated within HPI, all other systems reviewed and are negative.    Past Medical History:  has a past medical history of Arthritis, Asthma, CAD (coronary artery disease), Cancer (HCC), Cataracts, both eyes, Eczema, Hypothyroidism, Macular degeneration, Osteoarthritis, Rosacea, and Thyroid disease.  Surgical History:  has a past surgical history that includes Hysterectomy; Breast surgery; Colonoscopy; Appendectomy; back surgery; joint replacement (.); Cardiac catheterization (2007); and Breast lumpectomy (Right).  Social History:  reports that she has never smoked. She has never used smokeless tobacco. She reports that she does not drink alcohol and does not use drugs.  Family History: family history is not on file.   Allergies: Penicillins, Chlorpheniramine maleate, Influenza virus vaccine, and Protonix [pantoprazole sodium]  CURRENT MEDICATIONS